# Patient Record
Sex: FEMALE | Race: WHITE | NOT HISPANIC OR LATINO | Employment: UNEMPLOYED | URBAN - METROPOLITAN AREA
[De-identification: names, ages, dates, MRNs, and addresses within clinical notes are randomized per-mention and may not be internally consistent; named-entity substitution may affect disease eponyms.]

---

## 2017-03-09 ENCOUNTER — OFFICE VISIT (OUTPATIENT)
Dept: FAMILY MEDICINE | Facility: CLINIC | Age: 27
End: 2017-03-09

## 2017-03-09 VITALS
WEIGHT: 133 LBS | BODY MASS INDEX: 22.16 KG/M2 | HEART RATE: 91 BPM | DIASTOLIC BLOOD PRESSURE: 82 MMHG | TEMPERATURE: 98.1 F | HEIGHT: 65 IN | OXYGEN SATURATION: 97 % | SYSTOLIC BLOOD PRESSURE: 118 MMHG

## 2017-03-09 DIAGNOSIS — N91.2 AMENORRHEA: Primary | ICD-10-CM

## 2017-03-09 DIAGNOSIS — Z30.8 ENCOUNTER FOR OTHER CONTRACEPTIVE MANAGEMENT: ICD-10-CM

## 2017-03-09 LAB — HCG UR QL: NEGATIVE

## 2017-03-09 RX ORDER — NORETHINDRONE ACETATE AND ETHINYL ESTRADIOL .03; 1.5 MG/1; MG/1
1 TABLET ORAL DAILY
Qty: 63 TABLET | Refills: 4 | Status: SHIPPED | OUTPATIENT
Start: 2017-03-09 | End: 2019-09-17

## 2017-03-09 NOTE — MR AVS SNAPSHOT
"              After Visit Summary   3/9/2017    Vanna Min    MRN: [id number]           Patient Information     Date Of Birth          1990        Visit Information        Provider Department      3/9/2017 11:20 AM Damian Rodriguez MD [facility]        Today's Diagnoses     Amenorrhea    -  1    Encounter for other contraceptive management           Follow-ups after your visit        Who to contact     Please call your clinic at [phone] to:    Ask questions about your health    Make or cancel appointments    Discuss your medicines    Learn about your test results    Speak to your doctor   If you have compliments or concerns about an experience at your clinic, or if you wish to file a complaint, please contact [facility] Patient Relations at [phone] or email us at [email or web address]         Additional Information About Your Visit        MyChart Information     Dubb is an electronic gateway that provides easy, online access to your medical records. With Dubb, you can request a clinic appointment, read your test results, renew a prescription or communicate with your care team.     To sign up for [employer] visit the website at [email or web address]   You will be asked to enter the access code listed below, as well as some personal information. Please follow the directions to create your username and password.     Your access code is: [email or web address]  Expires: 3/12/2017  1:56 PM     Your access code will  in 90 days. If you need help or a new code, please contact your [facility] or call [phone] for assistance.        Care EveryWhere ID     This is your Care EveryWhere ID. This could be used by other organizations to access your [facility] medical records  [id number]        Your Vitals Were     Pulse Temperature Height Pulse Oximetry BMI (Body Mass Index)       91 98.1  F (36.7  C) (Oral) 5' 5\" " (165.1 cm) 97% 22.13 kg/m2        Blood Pressure from Last 3 Encounters:   03/09/17 118/82   09/06/16 111/73   06/29/16 113/77    Weight from Last 3 Encounters:   03/09/17 133 lb (60.3 kg)   09/06/16 139 lb 9.6 oz (63.3 kg)   06/29/16 135 lb 9.6 oz (61.5 kg)              We Performed the Following     HCG Qualitative Urine (UPT)  (P )          Today's Medication Changes          These changes are accurate as of: 3/9/17 11:45 AM.  If you have any questions, ask your nurse or doctor.               These medicines have changed or have updated prescriptions.        Dose/Directions    * norethindrone-ethinyl estradiol 1-20 MG-MCG per tablet   Commonly known as:  MICROGESTIN 1/20   This may have changed:  Another medication with the same name was added. Make sure you understand how and when to take each.   Used for:  Encounter for other contraceptive management        Dose:  1 tablet   Take 1 tablet by mouth daily   Quantity:  63 tablet   Refills:  3       * norethindrone-ethinyl estradiol 1.5-30 MG-MCG per tablet   Commonly known as:  MICROGESTIN 1.5/30   This may have changed:  You were already taking a medication with the same name, and this prescription was added. Make sure you understand how and when to take each.   Used for:  Encounter for other contraceptive management        Dose:  1 tablet   Take 1 tablet by mouth daily Pharmacy may substitute similar standard dose OCP if needed per insurance requirements.   Quantity:  63 tablet   Refills:  4       * Notice:  This list has 2 medication(s) that are the same as other medications prescribed for you. Read the directions carefully, and ask your doctor or other care provider to review them with you.         Where to get your medicines      These medications were sent to [facility] [address] [facility] [address] AT [facility] [address]  [address], [facility] [address]     Phone:  [phone]     norethindrone-ethinyl  estradiol 1.5-30 MG-MCG per tablet                Primary Care Provider Office Phone # Fax #    Damian Rodriguez [phone]       [facility] [address] [place] [address]        Thank you!     Thank you for choosing [facility]  for your care. Our goal is always to provide you with excellent care. Hearing back from our patients is one way we can continue to improve our services. Please take a few minutes to complete the written survey that you may receive in the mail after your visit with us. Thank you!             Your Updated Medication List - Protect others around you: Learn how to safely use, store and throw away your medicines at www.disposemymeds.org.          This list is accurate as of: 3/9/17 11:45 AM.  Always use your most recent med list.                   Brand Name Dispense Instructions for use    * norethindrone-ethinyl estradiol 1-20 MG-MCG per tablet    MICROGESTIN 1/20    63 tablet    Take 1 tablet by mouth daily       * norethindrone-ethinyl estradiol 1.5-30 MG-MCG per tablet    MICROGESTIN 1.5/30    63 tablet    Take 1 tablet by mouth daily Pharmacy may substitute similar standard dose OCP if needed per insurance requirements.       * Notice:  This list has 2 medication(s) that are the same as other medications prescribed for you. Read the directions carefully, and ask your doctor or other care provider to review them with you.

## 2017-03-09 NOTE — PROGRESS NOTES
"[facility]        Subjective   HPI: Vanna Min is a 26 year old  female without significant medical history who presents as an established patient for the followin) Amenorrhea: Was taking low dose microgestin  - had a few periods, but only 1 period since then. Last regular period about 4 months ago.  Before Rita was born (currently 15 months) had normal periods. Misses the reassurance of having her period to know she's not pregnant.     Did have some cramps a few days ago. Two days ago made it to the blank week.     ROS: constitutional, cardiovascular, respiratory, GI, , MSK systems reviewed and negative except as noted above.    PMH:  Patient Active Problem List   Diagnosis     Health Care Home     Screening for cervical cancer      Current Outpatient Prescriptions   Medication         norethindrone-ethinyl estradiol (MICROGESTIN ) 1-20 MG-MCG per tablet     No current facility-administered medications for this visit.       ALLERGIES: Sulfa drugs  Social: Denies tobacco, alcohol and illicit drug use.           Objective   /82  Pulse 91  Temp 98.1  F (36.7  C) (Oral)  Ht 5' 5\" (165.1 cm)  Wt 133 lb (60.3 kg)  SpO2 97%  BMI 22.13 kg/m2 Body mass index is 22.13 kg/(m^2).  Gen: healthy, alert and no distress,  HEENT: no adenopathy, no asymmetry, masses, or scars  Pulm: lungs clear to auscultation - no rales, rhonchi or wheezes  CV: regular rate and rhythm,  and no murmur, click,  rub or gallop  Abd: soft, nontender, without hepatosplenomegaly or masses  Psych: mood and affect normal/bright    Results for orders placed or performed in visit on 17 (from the past 24 hour(s))   HCG Qualitative Urine (UPT)  ([facility])   Result Value Ref Range    HCG Qual Urine NEGATIVE Negative            Assessment & Plan   1. Amenorrhea  - HCG Qualitative Urine (UPT)  ([facility])    2. Encounter for other contraceptive management  Neg UPT as above. Discussed her estrogen dose was likely " too low and this was the cause of the amenorrhea. Reviewed that this is still effective birth control but that if she would like the reassurance of having a period every month we can change to a higher dose of estrogen. Reviewed the slightly high risk of adverse effects, including thromboembolism. Doesn't smoke.    - norethindrone-ethinyl estradiol (MICROGESTIN 1.5/30) 1.5-30 MG-MCG per tablet; Take 1 tablet by mouth daily Pharmacy may substitute similar standard dose OCP if needed per insurance requirements.  Dispense: 63 tablet; Refill: 4    Precepted today with: MD Damian Mccall MD

## 2017-03-16 NOTE — PROGRESS NOTES
Preceptor Attestation:  Patient's case reviewed and discussed with Damian Rodriguez MD.  Patient seen and discussed with the resident.  I agree with assessment and plan of care.  Supervising Physician:  Summer Hi MD  [facility]

## 2017-06-12 DIAGNOSIS — Z30.41 ENCOUNTER FOR SURVEILLANCE OF CONTRACEPTIVE PILLS: Primary | ICD-10-CM

## 2017-06-12 RX ORDER — NORGESTIMATE AND ETHINYL ESTRADIOL 0.25-0.035
1 KIT ORAL DAILY
Qty: 28 TABLET | Refills: 11 | Status: SHIPPED | OUTPATIENT
Start: 2017-06-12 | End: 2019-09-17

## 2018-01-18 ENCOUNTER — OFFICE VISIT (OUTPATIENT)
Dept: FAMILY MEDICINE | Facility: CLINIC | Age: 28
End: 2018-01-18
Payer: COMMERCIAL

## 2018-01-18 VITALS
BODY MASS INDEX: 21.79 KG/M2 | RESPIRATION RATE: 20 BRPM | HEART RATE: 82 BPM | OXYGEN SATURATION: 94 % | DIASTOLIC BLOOD PRESSURE: 63 MMHG | SYSTOLIC BLOOD PRESSURE: 115 MMHG | TEMPERATURE: 98.2 F | HEIGHT: 65 IN | WEIGHT: 130.8 LBS

## 2018-01-18 DIAGNOSIS — L30.9 ECZEMA, UNSPECIFIED TYPE: Primary | ICD-10-CM

## 2018-01-18 RX ORDER — BENZOCAINE/MENTHOL 6 MG-10 MG
LOZENGE MUCOUS MEMBRANE
Qty: 30 G | Refills: 0 | Status: SHIPPED | OUTPATIENT
Start: 2018-01-18 | End: 2019-09-18

## 2018-01-18 NOTE — PROGRESS NOTES
"       HPI:   Vanna Min is a 27 year old  female with rash on right elbow.    Rash:  - rash on right elbow   - started a couple years ago  - 2 weeks ago had a flare where the rash spread  - has tried regular lotion with little relief   - not painful  - occasionally itchy  - not present on other areas of body            Physical Exam:     Vitals:    01/18/18 1423   BP: 115/63   Pulse: 82   Resp: 20   Temp: 98.2  F (36.8  C)   SpO2: 94%   Weight: 130 lb 12.8 oz (59.3 kg)   Height: 5' 4.5\" (163.8 cm)     Body mass index is 22.11 kg/(m^2).    GENERAL APPEARANCE: healthy, alert and no distress  RESP: lungs clear to auscultation - no rales, rhonchi or wheezes  CV: regular rates and rhythm, no murmur, no peripheral edema and peripheral pulses 2+  SKIN: 3 cm dry, scaly plaque on right elbow  PSYCH: mentation appears normal and affect normal/bright    Assessment and Plan   1. Eczema, unspecified type  Appears to be a patch of eczema on right elbow. Discussed applying vaseline BID for increased hydration to area. Also sent hydrocortisone cream to pharmacy if patient desires to try that. Discussed using for short course and avoiding application to face.   - hydrocortisone (CORTAID) 1 % cream; Apply sparingly to affected area three times daily for 7 days.  Dispense: 30 g; Refill: 0    Options for treatment and follow-up care were reviewed with the patient and/or guardian. Vanna Min and/or guardian engaged in the decision making process and verbalized understanding of the options discussed and agreed with the final plan.    Kaykay Marely DO  [facility] Medicine Resident    Precepted with: Buddy Leblanc MD          "

## 2018-01-18 NOTE — PROGRESS NOTES
Preceptor Attestation:  Patient's case reviewed and discussed with Kaykay Marley MD Patient seen and discussed with the resident.. I agree with assessment and plan of care.  Supervising Physician:  Buddy Leblanc MD  [facility]

## 2018-01-18 NOTE — MR AVS SNAPSHOT
"              After Visit Summary   1/18/2018    Vanna Min    MRN: [id number]           Patient Information     Date Of Birth          1990        Visit Information        Provider Department      1/18/2018 2:00 PM Kaykay Marley MD [facility]        Today's Diagnoses     Eczema, unspecified type    -  1       Follow-ups after your visit        Follow-up notes from your care team     Return in about 2 weeks (around 2/1/2018), or if symptoms worsen or fail to improve.      Who to contact     Please call your clinic at [phone] to:    Ask questions about your health    Make or cancel appointments    Discuss your medicines    Learn about your test results    Speak to your doctor   If you have compliments or concerns about an experience at your clinic, or if you wish to file a complaint, please contact [facility] Patient Relations at [phone] or email us at [email or web address]         Additional Information About Your Visit        Care EveryWhere ID     This is your Care EveryWhere ID. This could be used by other organizations to access your [facility] medical records  [id number]        Your Vitals Were     Pulse Temperature Respirations Height Pulse Oximetry BMI (Body Mass Index)    82 98.2  F (36.8  C) 20 5' 4.5\" (163.8 cm) 94% 22.11 kg/m2       Blood Pressure from Last 3 Encounters:   01/18/18 115/63   03/09/17 118/82   09/06/16 111/73    Weight from Last 3 Encounters:   01/18/18 130 lb 12.8 oz (59.3 kg)   03/09/17 133 lb (60.3 kg)   09/06/16 139 lb 9.6 oz (63.3 kg)              Today, you had the following     No orders found for display         Today's Medication Changes          These changes are accurate as of: 1/18/18 11:59 PM.  If you have any questions, ask your nurse or doctor.               Start taking these medicines.        Dose/Directions    hydrocortisone 1 % cream   Commonly known as:  CORTAID   Started by:  Kaykay Marley MD     "    Apply sparingly to affected area three times daily for 7 days.   Quantity:  30 g   Refills:  0            Where to get your medicines      These medications were sent to [id number] - [place], [place] - [address] AT [facility] & [address], [place] [address]     Phone:  [phone]     hydrocortisone 1 % cream                Primary Care Provider Office Phone # Fax #    Damian Rodriguez [phone]       [facility] [address] [facility] [address]        Equal Access to Services     [facility]: Hadii aad ku hadasho Soomaali, waaxda luqadaha, qaybta kaalmada adeegyada, anya stewartin hayaan adejose maria cardozo . So [facility] [phone].    ATENCIÓN: Si habla español, tiene a young disposición servicios gratuitos de asistencia lingüística. Ll[facility] [phone].    We comply with applicable federal civil rights laws and [place] laws. We do not discriminate on the basis of race, color, national origin, age, disability, sex, sexual orientation, or gender identity.            Thank you!     Thank you for choosing [facility]  for your care. Our goal is always to provide you with excellent care. Hearing back from our patients is one way we can continue to improve our services. Please take a few minutes to complete the written survey that you may receive in the mail after your visit with us. Thank you!             Your Updated Medication List - Protect others around you: Learn how to safely use, store and throw away your medicines at www.disposemymeds.org.          This list is accurate as of: 1/18/18 11:59 PM.  Always use your most recent med list.                   Brand Name Dispense Instructions for use Diagnosis    hydrocortisone 1 % cream    CORTAID    30 g    Apply sparingly to affected area three times daily for 7 days.        * norethindrone-ethinyl estradiol 1-20 MG-MCG per tablet    MICROGESTIN 1/20     63 tablet    Take 1 tablet by mouth daily    Encounter for other contraceptive management       * norethindrone-ethinyl estradiol 1.5-30 MG-MCG per tablet    MICROGESTIN 1.5/30    63 tablet    Take 1 tablet by mouth daily Pharmacy may substitute similar standard dose OCP if needed per insurance requirements.    Encounter for other contraceptive management       norgestimate-ethinyl estradiol 0.25-35 MG-MCG per tablet    ORTHO-CYCLEN, SPRINTEC    28 tablet    Take 1 tablet by mouth daily    Encounter for surveillance of contraceptive pills       * Notice:  This list has 2 medication(s) that are the same as other medications prescribed for you. Read the directions carefully, and ask your doctor or other care provider to review them with you.

## 2018-06-24 ENCOUNTER — HEALTH MAINTENANCE LETTER (OUTPATIENT)
Age: 28
End: 2018-06-24

## 2018-09-23 ENCOUNTER — TELEPHONE (OUTPATIENT)
Dept: FAMILY MEDICINE | Facility: CLINIC | Age: 28
End: 2018-09-23

## 2018-09-23 DIAGNOSIS — N30.00 ACUTE CYSTITIS WITHOUT HEMATURIA: Primary | ICD-10-CM

## 2018-09-23 RX ORDER — NITROFURANTOIN 25; 75 MG/1; MG/1
100 CAPSULE ORAL 2 TIMES DAILY
Qty: 10 CAPSULE | Refills: 0 | Status: SHIPPED | OUTPATIENT
Start: 2018-09-23 | End: 2018-09-23

## 2018-09-23 RX ORDER — CEPHALEXIN 500 MG/1
500 CAPSULE ORAL 2 TIMES DAILY
Qty: 10 CAPSULE | Refills: 0 | Status: SHIPPED | OUTPATIENT
Start: 2018-09-23 | End: 2018-09-28

## 2018-09-23 NOTE — TELEPHONE ENCOUNTER
Received call from the patient as the on-call provider. Has been having urinary frequency and burning since her birthday 5 days ago. Has been treated for UTIs in the past, but none recently. No fevers or chills. Has an allergy listed for Sulfa drugs which she states is from childhood, unknown reaction.    I sent a Rx for Macrobid and told her to please call or be seen in clinic for worsening symptoms or drug reaction.    Pedro Sosua MD  [facility], PGY-2

## 2018-09-23 NOTE — TELEPHONE ENCOUNTER
Patient called in again, she had an issue with her insurance and wanted a cheaper medication for her UTI. I sent a Rx for Keflex ~$10 for the script.    Pedro Sousa MD  [facility], PGY-2

## 2019-09-17 ENCOUNTER — OFFICE VISIT (OUTPATIENT)
Dept: FAMILY MEDICINE | Facility: CLINIC | Age: 29
End: 2019-09-17
Payer: COMMERCIAL

## 2019-09-17 VITALS
WEIGHT: 140 LBS | OXYGEN SATURATION: 98 % | TEMPERATURE: 98 F | DIASTOLIC BLOOD PRESSURE: 74 MMHG | BODY MASS INDEX: 23.9 KG/M2 | RESPIRATION RATE: 16 BRPM | HEIGHT: 64 IN | HEART RATE: 89 BPM | SYSTOLIC BLOOD PRESSURE: 113 MMHG

## 2019-09-17 DIAGNOSIS — Z32.01 PREGNANCY TEST POSITIVE: Primary | ICD-10-CM

## 2019-09-17 DIAGNOSIS — N91.2 AMENORRHEA: ICD-10-CM

## 2019-09-17 LAB — HCG UR QL: POSITIVE

## 2019-09-17 RX ORDER — PRENATAL VIT/IRON FUM/FOLIC AC 27MG-0.8MG
1 TABLET ORAL DAILY
Qty: 90 TABLET | Refills: 3 | Status: SHIPPED | OUTPATIENT
Start: 2019-09-17 | End: 2019-12-19

## 2019-09-17 ASSESSMENT — MIFFLIN-ST. JEOR: SCORE: 1350.91

## 2019-09-17 NOTE — PROGRESS NOTES
Assessment and Plan     1. Pregnancy test positive  Advised against starting any meds, EtOH, nicotine, or drugs. Patient to get set up for labs, US, and pregnancy care here.    2. Amenorrhea  - HCG Qualitative Urine (UPT)  ([facility])  - Prenatal Vit-Fe Fumarate-FA (PRENATAL MULTIVITAMIN W/IRON) 27-0.8 MG tablet; Take 1 tablet by mouth daily  Dispense: 90 tablet; Refill: 3      Options for treatment and follow-up care were reviewed with the patient and/or guardian. Vanna Min and/or guardian engaged in the decision making process and verbalized understanding of the options discussed and agreed with the final plan. I answered all of their questions.    Stalin Crain III, MD, FAAFP  [facility]  09/18/19 9:01 AM           HPI:       Vanna Min is a 29 year old  female  Patient presents with:  Pregnancy Test: positive home pregnancy test  Medication Reconciliation: needs attention    LMP: 4-6 weeks ago, patient isn't totally sure.    Second pregnancy. No issues with the last one. Denies GDM or pre-term labor. Not currently taking any medicines. Desired pregnancy and is excited. Denies EtOH, nicotine, or drug use. Interested in following for her pregnancy here. Feeling well today. No problems with PO intake or nausea.         PMHX:     Patient Active Problem List   Diagnosis     Screening for cervical cancer       Current Outpatient Medications   Medication Sig Dispense Refill     Prenatal Vit-Fe Fumarate-FA (PRENATAL MULTIVITAMIN W/IRON) 27-0.8 MG tablet Take 1 tablet by mouth daily 90 tablet 3       Social History     Socioeconomic History     Marital status: Single     Spouse name: Not on file     Number of children: Not on file     Years of education: Not on file     Highest education level: Not on file   Occupational History     Not on file   Social Needs     Financial resource strain: Not on file     Food insecurity:     Worry: Not on file     Inability: Not on file      "Transportation needs:     Medical: Not on file     Non-medical: Not on file   Tobacco Use     Smoking status: Former Smoker     Smokeless tobacco: Former User     Quit date: 12/2/2013   Substance and Sexual Activity     Alcohol use: No     Alcohol/week: 0.0 oz     Drug use: No     Sexual activity: Yes     Partners: Male     Birth control/protection: None   Lifestyle     Physical activity:     Days per week: Not on file     Minutes per session: Not on file     Stress: Not on file   Relationships     Social connections:     Talks on phone: Not on file     Gets together: Not on file     Attends Tenriism service: Not on file     Active member of club or organization: Not on file     Attends meetings of clubs or organizations: Not on file     Relationship status: Not on file     Intimate partner violence:     Fear of current or ex partner: Not on file     Emotionally abused: Not on file     Physically abused: Not on file     Forced sexual activity: Not on file   Other Topics Concern     Parent/sibling w/ CABG, MI or angioplasty before 65F 55M? Not Asked   Social History Narrative     Not on file       Allergies   Allergen Reactions     Sulfa Drugs        Results for orders placed or performed in visit on 09/17/19 (from the past 24 hour(s))   HCG Qualitative Urine (UPT)  ([facility])   Result Value Ref Range    HCG Qual Urine POSITIVE (A) Negative            Review of Systems:     Review of Systems   All other systems reviewed and are negative.           Physical Exam:     Vitals:    09/17/19 1539   BP: 113/74   Pulse: 89   Resp: 16   Temp: 98  F (36.7  C)   TempSrc: Oral   SpO2: 98%   Weight: 63.5 kg (140 lb)   Height: 1.635 m (5' 4.37\")     Body mass index is 23.76 kg/m .    Physical Exam   Constitutional: She is oriented to person, place, and time. She appears well-developed and well-nourished.  Non-toxic appearance. She does not have a sickly appearance. No distress.   Pulmonary/Chest: No respiratory distress. "   Neurological: She is alert and oriented to person, place, and time.   Skin: She is not diaphoretic.   Nursing note and vitals reviewed.

## 2019-09-18 ENCOUNTER — TELEPHONE (OUTPATIENT)
Dept: FAMILY MEDICINE | Facility: CLINIC | Age: 29
End: 2019-09-18

## 2019-09-18 NOTE — TELEPHONE ENCOUNTER
Called patient for OB intake, no answer. Left VM on identified VM to call clinic for intake. Rudolph GENTILE

## 2019-09-18 NOTE — TELEPHONE ENCOUNTER
Patient returned call to KRUPA Alcantar at 12:01pm. Notified patient their will be a meeting from 12pm-1pm and I would take a message for the nurse to return the call. Patient understood. Please call and advise.

## 2019-09-27 NOTE — TELEPHONE ENCOUNTER
Vanna is a 28yo female  who came into clinic with positive pregnancy test. Vanna has a medical history of anxiety, not currently taking medication. Her mother has a history of breast cancer, and FOB's mother has a history of tuberculosis approximately 24 years ago. Vanna has a significant drug allergy history to sulfa drugs. She also noted Prozac has caused her to have breathing difficulties. Vanna's previous delivery was complicated with GBS+,  resuscitation requiring intubation and suctioning for 1 minute. The  was born at 6lb7oz via . No other medical history noted. Vanna endorses her LMP is unknown. Vanna wishes to see a female provider, she is schedule for NOB on 10/16/2019 with . Rudolph GENTILE    Average Risk Category  No significant risk factors: No    At Risk Category (up to 3)  Teen pregnancy: No  Poor social situation: No  Domestic abuse: No  Financial difficulties: No  Smoker: No  H/O  deliver: No  H/O drug abuse: No  Non-English speaking: No  Advanced maternal age: No  GDM risks: No  Previous C/S: No  H/O PIH: No  H/O STIs: No  H/O mental health concerns: Yes, h/o anxiety  Onset care > 20 weeks: No  Other:     High Risk Category (4 or more At Risk or)  Diabetes/GDM: No  Multiple gestation: No  Chronic hypertension: No  Significant hx of asthma: No  Fetal demise > 20 weeks: No  Positive tox screen: No  Current mental health treatment: No  Other:     Risk: Average Risk   Date determined: 2019  Rudolph GENTILE

## 2019-10-16 ENCOUNTER — OFFICE VISIT (OUTPATIENT)
Dept: FAMILY MEDICINE | Facility: CLINIC | Age: 29
End: 2019-10-16
Payer: COMMERCIAL

## 2019-10-16 VITALS
OXYGEN SATURATION: 97 % | WEIGHT: 145 LBS | RESPIRATION RATE: 18 BRPM | TEMPERATURE: 98.1 F | HEART RATE: 88 BPM | BODY MASS INDEX: 24.16 KG/M2 | HEIGHT: 65 IN | DIASTOLIC BLOOD PRESSURE: 77 MMHG | SYSTOLIC BLOOD PRESSURE: 116 MMHG

## 2019-10-16 DIAGNOSIS — Z34.81 ENCOUNTER FOR SUPERVISION OF OTHER NORMAL PREGNANCY, FIRST TRIMESTER: Primary | ICD-10-CM

## 2019-10-16 LAB
BILIRUBIN UR: NEGATIVE MG/DL
BLOOD UR: NEGATIVE MG/DL
GLUCOSE URINE: NEGATIVE
HCT VFR BLD AUTO: 40.1 % (ref 35–47)
HEMOGLOBIN: 12.3 G/DL (ref 11.7–15.7)
HIV 1+2 AB+HIV1 P24 AG SERPL QL IA: NEGATIVE
KETONES UR QL: NEGATIVE MG/DL
LEUKOCYTE ESTERASE UR: NEGATIVE
MCH RBC QN AUTO: 29.8 PG (ref 26.5–35)
MCHC RBC AUTO-ENTMCNC: 30.7 G/DL (ref 32–36)
MCV RBC AUTO: 97.2 FL (ref 78–100)
NITRITE UR QL STRIP: NEGATIVE MG/DL
PH UR STRIP: 6 [PH] (ref 4.5–8)
PLATELET # BLD AUTO: 382 K/UL (ref 150–450)
PROTEIN UR: NEGATIVE MG/DL
RBC # BLD AUTO: 4.13 M/UL (ref 3.8–5.2)
SP GR UR STRIP: 1.02 (ref 1–1.03)
UROBILINOGEN UR STRIP-ACNC: NORMAL E.U./DL
WBC # BLD AUTO: 12.8 K/UL (ref 4–11)

## 2019-10-16 ASSESSMENT — MIFFLIN-ST. JEOR: SCORE: 1377.98

## 2019-10-16 NOTE — LETTER
October 16, 2019      Vanna Min  [address]  [place] [address]        To whom is may concern,    Roz Min has a confirmed pregnancy on 9/17/2019. If you have any questions please contact [facility] at [phone].        Sincerely,          Arline Casey MD

## 2019-10-16 NOTE — LETTER
October 18, 2019      Vanna Min  [address]  [place] [address]        Dear Vanna,    Please see below for your test results.      Thank you for visiting our clinic on Oct 16, 2019.     We performed several lab tests at your first OB visit and the results are shown below. Your blood type is AB positive.  Your tests for Hepatitis B, HIV, syphilis, gonorrhea, chlamydia, and HIV were negative. Your urine test was normal. You do not have anemia. Your white blood cell count was a little high, however your other labs were negative for signs of infection and your exam was normal, so we do not need to do anything about this slightly high white blood cell count at this time. You are immune to the rubella virus.     If you have any questions or concerns, please feel free to give us a call.       Resulted Orders   ABO/Rh Type-HML ([facility])   Result Value Ref Range    ABO/Rh(D) AB POS     Repeat ABO/Rh Typing (L) AB POS     Narrative    Test performed by:   [facility] [address], [place] [address]   Antibody Screen ([facility])   Result Value Ref Range    Antibody Screen Negative Negative    Narrative    Test performed by:   [facility] [address], [place] [address]   Chlamydia/Gono Amplified ([facility])   Result Value Ref Range    Chlamydia trac,Amplified Prb Negative Negative    N gonorrhoeae,Amplified Prb Negative Negative    Narrative    Test performed by:  [facility]  [address] [place] [address]   CBC with Plt ([facility])   Result Value Ref Range    WBC 12.8 (H) 4.0 - 11.0 K/uL    RBC 4.13 3.80 - 5.20 M/uL    Hemoglobin 12.3 11.7 - 15.7 g/dL    Hematocrit 40.1 35.0 - 47.0 %    MCV 97.2 78.0 - 100.0 fL    MCH 29.8 26.5 - 35.0 pg    MCHC 30.7 (L) 32.0 - 36.0 g/dL    Platelets 382.0 150.0 - 450.0 K/uL   Culture Urine ([facility])   Result Value Ref Range    Culture SEE RESULTS BELOW       Comment:      CULTURE, URINE   SOURCE: Urine, Random   CULTURE RESULTS:     No Growth      Narrative    Test performed by:  [facility] [address] [place] [address]   Hepatitis B Surface Ag ([facility])   Result Value Ref Range    Hepatitis B Surface Antigen Negative Negative    Narrative    Test performed by:  [facility] [address] [place] [address]   HIV Ag/Ab Screen Bee ([facility])   Result Value Ref Range    HIV Antigen/Antibody Negative Negative    Narrative    Test performed by:  [facility] [address] [place] [address]  Method is Abbott HIV Ag/Ab for the detection of HIV p24 antigen, HIV-1   antibodies and HIV-2 antibodies.   Rubella  IgG ([facility])   Result Value Ref Range    Rubella IgG Positive     Narrative    Test performed by:  [facility] [address] [place] [address]  Negative: Absence of detectable rubella virus IgG antibodies. A negative   result presumes that immunity has not been acquired.   Equivocal: Suggest recollection.  Positive: Considered positive for IgG antibodies to rubella virus.   Syphilis Screen Bee ([facility])   Result Value Ref Range    Treponema Antibody (Syphilis) Negative Negative    Narrative    Test performed by:  [facility] [address] [place] [address]   Urinalysis([facility])   Result Value Ref Range    Specific Gravity Urine 1.025 1.005 - 1.030    pH Urine 6.0 4.5 - 8.0    Leukocyte Esterase UR Negative NEGATIVE    Nitrite Urine Negative NEGATIVE mg/dL    Protein UR Negative NEGATIVE mg/dL    Glucose Urine Negative NEGATIVE    Ketones Urine Negative NEGATIVE mg/dL    Urobilinogen mg/dL 0.2 E.U./dL 0.2 E.U./dL E.U./dL    Bilirubin UR Negative NEGATIVE mg/dL    Blood UR Negative NEGATIVE mg/dL       If you have any questions, please call the clinic to make an appointment.    Sincerely,    Arline Casey MD

## 2019-10-16 NOTE — PATIENT INSTRUCTIONS
[facility] Information  If you have any further concerns or wish to schedule another appointment, please call our office at [phone] during normal business hours (8-5, M-F).     For uncomplicated pregnancies, you will be seeing your doctor once a month until you are 28 weeks, then you will see your doctor twice a month. You will begin weekly visits at 36 weeks until you deliver.     If you have urgent medical questions that cannot wait, you may call [phone] at any time of day. Call your doctor if you experience any bleeding or abdominal cramping early in pregnancy.     If you have a medical emergency, please call [phone].  Tips to Relieve Nausea  Although nausea can occur at any time of the day, it may be worse in the morning. To help prevent nausea:    Eat small, light meals at frequent intervals.    Get up slowly. Eat a few unsalted crackers before you get out of bed.    Drink water with lemon slices or 7-up to soothe your stomach.    Eat an ice pop in your favorite flavor.    Ask your health care provider about taking jaky or vitamin B6 for nausea and vomiting.    Talk with your health care provider if you take vitamins that upset your stomach.  Safe Medications    Take one prenatal vitamin with at least 400 mcg of folic acid daily.    Use acetaminophen (Tylenol) for pain.     Try Maalox or Tums for heartburn. If these are not working, talk to your doctor about trying a different medication.    Diphenhydramine (Benadryl) can also be used safely in pregnancy.    Talk to your doctor about any other medications or vitamins you are taking!  Start Healthy Habits Now  What matters most is protecting your baby from this moment on. If you smoke, drink alcohol, or use drugs, now is the time to stop. If you need help, talk with your health care provider.    Smoking increases the risk of miscarriage or having a low-birth-weight baby. If you smoke, quit now.    Alcohol and drugs have been linked with  miscarriage, birth defects, intellectual disability, and low birth weight. Do not drink alcohol or take drugs.    Continue your current exercise routine, or start one with walking, swimming, and other low impact exercises. Yoga specifically designed for pregnant moms is a great stress and pain reliever. Keep your self well hydrated and avoid overheating with all activity. If bleeding, fluid leakage, or cramping/contractions occur, stop the exercise and call your doctor.     Wear your seatbelt every time you are in the car. Fasten the lap part underneath your growing belly and the shoulder part as you normally would.   Weight Gain    Add an additional 300 to 400 calories a day into your diet.    Ideal weight gain is 25 to 35 pounds.    If your BMI is over 30, your ideal weight gain is 15 pounds.    If your BMI is under 20, your ideal weight gain is 40 pounds.    Talk to your doctor if you are concerned about weight gain.   Keep Your Environment Save  You can still clean house and use scented products. Just take some simple precautions:    Wear gloves when using cleaning fluids.    Open windows to let in fresh air. Use a fan if you paint.    Avoid secondhand smoke.    Don t breathe fumes from nail polish, hair spray, cleansers, or other chemicals.  Work Concerns  The end of the first trimester is a good time to discuss working during pregnancy with your employer. Follow your health care provider s advice if your job requires you to stand for a long time, work with hazardous tools, or even sit at a desk all day. Your workspace, workload, or scheduled hours may need to be adjusted - perhaps you can change body postures more often or take an extra break.  Intimacy  Unless your health care provider tells you to, there is no reason to stop having sex while you re pregnant. You or your partner may notice changes in desire. Desire may be less in the first trimester, due to nausea and fatigue. In the second trimester, sex may  be very enjoyable. The third trimester can be a challenge comfort-wise. Try different positions and see what s best for you both. As always, let your doctor know if you experience any bleeding, leakage of fluids, or cramping/contractions.  Other Pregnancy Concerns    Limit the amount of radiation you are exposed to. Always tell the radiology technologist that you are pregnant if having an xray or CT scan done.     Practice good hand washing to prevent infection.    Avoid cat litter.     Wash all fruits and vegetabes. Always cook meat thoroughly and do not eat raw fish. Do not eat more than 6 to 12 ounces of other fish sources.     Limit caffeine to less than 300 milligrams a days. The average cup of coffee as approximately 120 milligrams of caffeine.      Breastfeeding: a Healthy Option for You and Your Baby    The choice of how you will feed your baby is important. Before your baby s birth, you ll want to learn about the benefits of breastfeeding. [facility] have been designated Baby Friendly; an initiative that was created by the World Health Organization and UNICEF. This helps give you and your baby the best start in feeding their baby.    Why should I breastfeed my baby?    Babies are less likely to develop childhood obesity or diabetes    Babies are less likely to suffer from recurrent ear infections    Babies are less likely to be hospitalized for respiratory conditions    Breast milk is rich in nutrients and antibodies-it is easy to digest    How does it benefit me?    Lowers the risk for diabetes, breast and ovarian cancer and postpartum depression    Moms can lose  baby weight  more quickly    Cost savings - formula can cost well over $1,500 per year    Convenient - no bottles and nipples to sterilize, no measuring and mixing formula    The physical contact with breastfeeding can make babies feel secure, warm and comforted     What about formula?  While you and your baby are staying with us at  [facility], we will support whatever feeding choice you make for your baby.      Some important considerations:      The American Academy of Pediatrics, the World Health Organization, and many more organizations recommend exclusive breastfeeding for 6 months and continued breastfeeding while adding other foods for the first 1-2 years.      Any amount of breastmilk has benefits to both baby and mother.    Giving formula in replacement of breastfeeding can affect mother s milk supply.  If formula is needed, hospital staff will work with you on a plan to help develop your milk supply.    Formula alters the natural growth of good bacteria in the  stomach.     Research has found that first time mothers who offer formula in the hospital have a shorter duration of breastfeeding.    How can I start to prepare?     Start by having a conversation with your medical provider.     Talk with your partner, family and friends.     Attend a prenatal class that includes breastfeeding preparation. Birth and breastfeeding classes are offered by [facility]. Visit [employer] for class information.     After your baby s birth, hospital staff and lactation consultants will help you and your baby get off to a great start with breastfeeding.    Resources:      [facility] clinic and hospital staff will support you throughout your pregnancy, delivery and beyond. Visit [email or web address] for more details.       A free weekly pregnancy and parenting email is offered by [facility]. Emails include week by week information about your pregnancy, baby s development, breastfeeding and beyond.  Sign up at [email or web address].      [facility] [phone].  Consultants are available for assessment of your breastfeeding concerns, support and education.      [employer] helps mothers worldwide to breastfeed through mother-to-mother support, encouragement, information and  education. [facility] promotes breastfeeding as an important element in the healthy development of baby and mother. Monthly classes, support groups and telephone help are offered in your community. To learn more visit [email or web address].      [facility] Care Connection: [phone] ([phone])

## 2019-10-16 NOTE — NURSING NOTE
"Chief Complaint   Patient presents with     Prenatal Care     New 1st OB check up.      Recheck Medication     complicated.        /77   Pulse 88   Temp 98.1  F (36.7  C) (Oral)   Resp 18   Ht 1.65 m (5' 4.96\")   Wt 65.8 kg (145 lb)   LMP 08/10/2019   SpO2 97%   BMI 24.16 kg/m      "

## 2019-10-16 NOTE — PROGRESS NOTES
Pt asleep breathing even and unlabored nos igns of any distress noted. Sitter in direct obs of pt. Will continue to   monitor.    Vanna Min is a 29 year old  female who presents to clinic for a new OB visit.  Her last menstrual period was unknown (first or second week of August) and she had a history of previous irregular menstrual cycles. Estimated Date of Delivery: approximately May 16, 2020, Making her approximately 9-10 weeks gestation today.    She has not had bleeding since her LMP. She has not had any abdominal pain or cramping since her LMP. She has had mild nausea. Weigh loss has not occurred. This was not a planned pregnancy, but is desired.     Pre Term Labor Risk Assessment  Is the patient's age <18 or >40? No  Patint's BMI is Body mass index is 24.16 kg/m .. Does patient have a BMI < 18.5? No  If previous pregnancy, was delivery within previous 6 months? No  Have you ever delivered a baby prior to 37 weeks gestation? No  Did conception for this pregnancy occur via In Vitro Fertilization? No  Are you carrying twins?  No  Summary: Patient is not high risk for  Labor for  labor.    Summary:  Patient is average risk for  Labor   The patient has the following risk factors for  labor: Q19: History of anxiety  Average risk pregnancy    Patient Active Problem List   Diagnosis     Screening for cervical cancer     Encounter for supervision of other normal pregnancy, first trimester     OB History    Para Term  AB Living   2 1 1 0 0 1   SAB TAB Ectopic Multiple Live Births   0 0 0 0 1      # Outcome Date GA Lbr Steven/2nd Weight Sex Delivery Anes PTL Lv   2 Current            1 Term 12/01/15 40w0d  2.92 kg (6 lb 7 oz) F Vag-Spont None N EVAN      Birth Comments: GBS +,  resuscitation: intubation, suctioning,PPV nasopharynx,mouth,nose. Suction below vocal cords- duration in 1 min      Name: Rita Min-Joe      Apgar1: 5  Apgar5: 9     Past Medical History:   Diagnosis Date     NO ACTIVE PROBLEMS      Varicella     hx of disease      Past Surgical History:   Procedure  Laterality Date     ENT SURGERY      tubes put in ears when 1 or 2     Current Outpatient Medications   Medication Sig Dispense Refill     Prenatal Vit-Fe Fumarate-FA (PRENATAL MULTIVITAMIN W/IRON) 27-0.8 MG tablet Take 1 tablet by mouth daily 90 tablet 3       Do you have a history of any of the following medical conditions?  Condition Yes/No   Hypertension No   Heart disease, mitral valve prolapse, rheumatic fever No   Asthma or another chronic lung disease No   An autoimmune disorder No   Kidney disease No   Frequent urinary tract infections No   Migraine headaches No   Stroke, loss of sensation/function, seizures, or other neuro problem No   Diabetes No   Thyroid problems or have you taken thyroid medication No   Hepatitis, liver disease, jaundice No   Blood clots, phlebitis, pulmonary embolism or varicose veins No   Excessive bleeding after surgery or dental work No   Heavy menstrual periods requiring treatment No   Anemia No   Blood transfusions No        Would you refuse a blood transfusion? No   Breast problems No   Abnormalities of the uterus No   Abnormal pap smear No   Have you been treated for an emotional disturbance? YES - prozac 10+ years ago   Have you been physically, sexually, or emotionally hurt by someone? No   Have you been in a major accident or suffered serious trauma? No   Have you had surgical procedures? Yes - PE tubes as a child   Have you ever had any complications from anesthesia? No   Have you ever been hospitalized for a nonsurgical reason? No     Prenatal Genetic Screening  Do you have a history of any of the following Yes/No   A metabolic disorder (e.g. Insulin-dependent DM, PKU) No   Recurrent pregnancy loss No     Do you, the baby's father, or anyone in your families have Yes/No   Thalassemia AND MCV <80 No   Hemophilia No   Neural tube defect No   Congenital heart defect No   Sickle cell disease or trait No   Muscular dystrophy No   Cystic fibrosis No   Mental retardation or  "autism No   Down's syndrome No   Jose Francisco-Sach's disease No   Cloud's chorea No   Any other inherited genetic or chromosomal disorder No   A child with birth defects not listed above No     Infection History  At high risk for coming in contact with HIV No   Ever treated for tuberculosis No   Ever received the BCG vaccine for tuberculosis No   Ever had genital herpes (or has your partner) No   Had a rash or viral illness since LMP No   Ever had a sexually transmitted infection No   Ever had chicken pox or the vaccine Yes   Ever had any other serious infectious disease No   Up to date with immunizations Yes   STI History YES - Trich 2011      PERSONAL/SOCIAL HISTORY  Have you used any tobacco products, alcohol or any other drugs during this pregnancy before or after your knew you were pregnant? No    REVIEW OF SYSTEMS  C: NEGATIVE for fever, chills, change in weight  E: NEGATIVE for vision changes or irritation  ENT: NEGATIVE for ear, mouth and throat problems  R: NEGATIVE for significant cough or SOB  B: NEGATIVE for masses, tenderness or discharge  CV: NEGATIVE for chest pain, palpitations or peripheral edema  GI: NEGATIVE for nausea, abdominal pain, heartburn, or change in bowel habits  : NEGATIVE for unusual urinary or vaginal symptoms.   M: NEGATIVE for significant arthralgias or myalgia  N: NEGATIVE for weakness, dizziness or paresthesias  P: NEGATIVE for changes in mood or affect    PHYSICAL EXAM:  /77   Pulse 88   Temp 98.1  F (36.7  C) (Oral)   Resp 18   Ht 1.65 m (5' 4.96\")   Wt 65.8 kg (145 lb)   LMP 08/10/2019   SpO2 97%   BMI 24.16 kg/m     Pregravid weight: 138 lb  GENERAL:  Pleasant pregnant female, alert, well groomed.  SKIN:  Warm and dry, without lesions or rashes  EYES:  PERRLA, EOM intact  MOUTH:  Buccal mucosa pink, moist without lesions.    NECK:  Thyroid without enlargement and nodules.  No cervical lymphadenopathy.  LUNGS:  Clear to auscultation.  HEART:  RRR without " murmur.  ABDOMEN: Soft without masses , tenderness or organomegaly.  No CVA tenderness. No scars noted. Fundus palpable at symphysis pubis.   MUSCULOSKELETAL:  Full range of motion.  EXTREMITIES:  No edema. No significant varicosities.     ASSESSMENT/PLAN  Patient Active Problem List   Diagnosis     Screening for cervical cancer     Encounter for supervision of other normal pregnancy, first trimester     Routine prenatal labs today. CBC, type and screen, rubella, HIV, gonorrhea/chlamydia, RPR, hepatitis B, urinalysis with culture. Pap smear up to date, next pap due 2020. Early ultrasound for dating ordered.     Referral(s): none    Discussed:  -recommended weight gain of > 35 lbs. for BMI of Body mass index is 24.16 kg/m .  -Influenza vaccine discussed and declined - will offer again at next visit.  -healthy habits including not using tobacco or alcohol, exercising regularly and maintaining healthy diet  -information given on tips for dealing with nausea, healthy habits, exposures, safety, prenatal appointment schedule, and when to call the doctor.    Will follow up in 4 weeks for routine pre- care.    Arline Casey MD  [facility] Family Medicine Resident    Precepted with: Lesli Varner MD

## 2019-10-17 LAB
ABO + RH BLD: NORMAL
BLD GP AB SCN SERPL QL: NEGATIVE
C TRACH RRNA SPEC QL NAA+PROBE: NEGATIVE
CULTURE: NORMAL
HBV SURFACE AG SERPL QL IA: NEGATIVE
N GONORRHOEA RRNA SPEC QL NAA+PROBE: NEGATIVE
REPEAT ABO/RH TYPING (HML): NORMAL
RUBV IGG SERPL QL IA: POSITIVE
TREPONEMA ANTIBODY (SYPHILIS): NEGATIVE

## 2019-10-22 NOTE — PROGRESS NOTES
Preceptor Attestation:  Patient's case reviewed and discussed with Arline Casey MD resident and I evaluated the patient. I agree with written assessment and plan of care.  Supervising Physician:  BANDAR ARMSTRONG MD  [facility]

## 2019-10-24 DIAGNOSIS — Z34.81 ENCOUNTER FOR SUPERVISION OF OTHER NORMAL PREGNANCY, FIRST TRIMESTER: ICD-10-CM

## 2019-11-19 ENCOUNTER — OFFICE VISIT (OUTPATIENT)
Dept: FAMILY MEDICINE | Facility: CLINIC | Age: 29
End: 2019-11-19
Payer: COMMERCIAL

## 2019-11-19 VITALS
SYSTOLIC BLOOD PRESSURE: 129 MMHG | RESPIRATION RATE: 24 BRPM | BODY MASS INDEX: 25.29 KG/M2 | HEIGHT: 65 IN | HEART RATE: 100 BPM | TEMPERATURE: 98 F | WEIGHT: 151.8 LBS | OXYGEN SATURATION: 94 % | DIASTOLIC BLOOD PRESSURE: 78 MMHG

## 2019-11-19 DIAGNOSIS — Z34.81 ENCOUNTER FOR SUPERVISION OF OTHER NORMAL PREGNANCY, FIRST TRIMESTER: Primary | ICD-10-CM

## 2019-11-19 ASSESSMENT — MIFFLIN-ST. JEOR: SCORE: 1409.44

## 2019-11-19 NOTE — PROGRESS NOTES
"Vanna Min is a 29 year old  female who presents to clinic for a follow up OB visit. She is currently 13w5d with an estimated date of delivery of May 20, 2020 via 10w US. She denies chest pain, abdominal pain/contractions, vaginal bleeding, or abnormal discharge.     New concerns today:     Taking prenatal vitamin daily to every other day.     Two episodes of nausea/vomiting. Improved with rest/fluids.     Headaches: similar to pre pregnancy, worse with stress    Is having some shortness of breath when supine, and sometimes with exertion. Feels this is similar to previous pregnancy   Physical exam:  /78   Pulse 100   Temp 98  F (36.7  C) (Oral)   Resp 24   Ht 1.651 m (5' 5\")   Wt 68.9 kg (151 lb 12.8 oz)   LMP 08/10/2019 (LMP Unknown)   SpO2 94%   BMI 25.26 kg/m    Wt Readings from Last 2 Encounters:   19 68.9 kg (151 lb 12.8 oz)   10/16/19 65.8 kg (145 lb)     General: alert, female in no acute distress  CV: 2/6 systolic flow murmur heard best at left upper sternal border   Abdomen: gravid uterus appropriate for gestation age 15cm above pubic symphysis, non-tender, FHTs 130's  Extremities: no edema    Assessment/Plan:  1. Encounter for supervision of other normal pregnancy, first trimester  Reviewed pre- labs.  Discussed warning signs to watch for and expectations for second trimester.    Follow up in 4 weeks for routine prenatal visit. Will do pap and flu shot at next visit    Devin Madison, MS3     I was present with the medical student who participated in the service and in the documentation of this note. I have verified the history and personally performed the physical exam and medical decision making, and have verified the content of the note, which accurately reflects my assessment of the patient and the plan of care.    Arline Casey MD  [facility] Family Medicine Resident    Precepted with: Enrrique Power MD    "

## 2019-11-19 NOTE — PROGRESS NOTES
Preceptor Attestation:   Patient seen, evaluated and discussed with the resident. I have verified the content of the note, which accurately reflects my assessment of the patient and the plan of care.  Supervising Physician:Enrrique Power MD  [facility]

## 2019-12-18 NOTE — PROGRESS NOTES
"Vanna Min is a 29 year old  female who presents to clinic for a follow up OB visit. She is currently 18w0d with an estimated date of delivery of May 20, 2020 via 10w US. She denies headache, chest pain, shortness of breath, abdominal pain/contractions, vaginal bleeding, or abnormal discharge. She has felt baby move.     New concerns today: No new concerns. Has been taking prenatal vitamin  every other day. Worried about excess weight gain (gained nearly 70 lbs with prior pregnancy), weight gain so far around 18 lbs.     Physical exam:  /73   Pulse 100   Temp 97.9  F (36.6  C) (Oral)   Resp 18   Ht 1.635 m (5' 4.37\")   Wt 71.1 kg (156 lb 12.8 oz)   LMP 08/10/2019 (LMP Unknown)   SpO2 97%   BMI 26.61 kg/m      General: alert, female in no acute distress  Abdomen: gravid uterus appropriate for gestation age above pubic symphysis, non-tender, FHTs 150  : normal external genitalia, normal vagina, normal cervix with physiologic discharge  Extremities: no edema     Assessment/Plan:  1. Encounter for supervision of other normal pregnancy, second trimester  - Declined flu shot today  - Discussed genetic screening options; patient declined  - Discussed expectations of the second trimester and when to come in  -  OB > 14 Weeks; Future - fetal anatomy ultrasound scheduled for 22 weeks  - Continued encouragement of breastfeeding.  - Refilled prenatal  - Follow up in 4 weeks    2. Screening for cervical cancer  - [facility]YN Cytology ([facility])    Arline Casey MD  [facility] Family Medicine Resident    Precepted with: Buddy Leblanc MD      "

## 2019-12-19 ENCOUNTER — OFFICE VISIT (OUTPATIENT)
Dept: FAMILY MEDICINE | Facility: CLINIC | Age: 29
End: 2019-12-19
Payer: COMMERCIAL

## 2019-12-19 VITALS
DIASTOLIC BLOOD PRESSURE: 73 MMHG | HEART RATE: 100 BPM | HEIGHT: 64 IN | WEIGHT: 156.8 LBS | RESPIRATION RATE: 18 BRPM | OXYGEN SATURATION: 97 % | BODY MASS INDEX: 26.77 KG/M2 | TEMPERATURE: 97.9 F | SYSTOLIC BLOOD PRESSURE: 107 MMHG

## 2019-12-19 DIAGNOSIS — N91.2 AMENORRHEA: ICD-10-CM

## 2019-12-19 DIAGNOSIS — Z34.82 ENCOUNTER FOR SUPERVISION OF OTHER NORMAL PREGNANCY, SECOND TRIMESTER: Primary | ICD-10-CM

## 2019-12-19 DIAGNOSIS — Z12.4 SCREENING FOR CERVICAL CANCER: ICD-10-CM

## 2019-12-19 RX ORDER — PRENATAL VIT/IRON FUM/FOLIC AC 27MG-0.8MG
1 TABLET ORAL DAILY
Qty: 90 TABLET | Refills: 3 | Status: SHIPPED | OUTPATIENT
Start: 2019-12-19 | End: 2020-07-22

## 2019-12-19 ASSESSMENT — MIFFLIN-ST. JEOR: SCORE: 1422.11

## 2019-12-19 NOTE — LETTER
January 3, 2020      Vanna Min  [address]  [place] [address]        Dear Vanna,    Please see below for your test results.      Thank you for visiting our clinic on Dec 19, 2019.     The results of your recent pap smear has returned.        1. Result is:   Normal   2. Date next is due: 5 years (Decemer 2024) With HPV     If you have any questions or concerns, please feel free to give us a call.   Resulted Orders   GYN Cytology ([facility])   Result Value Ref Range    Lab AP Case Report SEE RESULTS BELOW       Comment:      Gynecologic Cytology Report                       Case: [id number]                                     Authorizing Provider:  Buddy Leblanc MD     Collected:             12/19/2019 1504              Ordering Location:      [facility] Lab Received:              12/20/2019 0854              First Screen:          Maia Lockett CT                                                                             (ASCP)                                                                         Specimen:    SUREPATH PAP, SCREENING, Endocervical/cervical                                               Lab AP Gyn Interpretation SEE RESULTS BELOW Negative for squamous intraepithelial le      Comment:      Negative for squamous intraepithelial lesion or malignancy  Electronically signed by Maia Lockett CT (ASCP) on 12/31/2019 at    8:54 AM      Lab AP Malignant? Normal Normal    Specimen adequacy:       Satisfactory for evaluation, endocervical/transformation zone component present    HPV Reflex? Yes regardless of result     High Risk? No     Last Mens Period 8/10/19     Lab AP Abnormal Bleeding No     Lab AP Patient Status Pregnant     Lab AP Birth Control/Hormones None     Lab AP Previous Normal 4/2015     Lab AP Previous Abnl none     Lab AP Cervical Appearance normal     Narrative    Test performed by:  [facility] [address] [place] [address]   [facility]   Result Value Ref Range    HPV Source SurePath     HPV 16 DNA Negative NEG    HPV 18 DNA Negative NEG    Other HR HPV Negative NEG    Final Diagnosis SEE NOTES       Comment:      This patient's sample is negative for HPV DNA.  This test was developed and its performance characteristics determined by the  [facility]. It  has not been cleared or approved by the FDA. The laboratory is regulated under  CLIA as qualified to perform high-complexity testing. This test is used for  clinical purposes. It should not be regarded as investigational or for  research.  (Note)  METHODOLOGY:  The Roche chinedu 4800 system uses automated extraction,  simultaneous amplification of HPV (L1 region) and beta-globin,  followed by  real time detection of fluorescent labeled HPV and beta  globin using specific oligonucleotide probes . The test specifically  identifies types HPV 16 DNA and HPV 18 DNA while concurrently  detecting the rest of the high risk types (31, 33, 35, 39, 45, 51,  52, 56, 58, 59, 66 or 68).     COMMENTS:  This test is not intended for use as a screening device  for women under age 30 with normal cervical   cytology.  Results should  be correlated with cytologic and histologic findings. Close clinical  followup is recommended.         Specimen Description Cervical Cells       Comment:         Performed and/or entered by:  [address] [address]       Narrative    Test performed by:  [facility]  [address], [place] [address]       If you have any questions, please call the clinic to make an appointment.    Sincerely,    Arline Casey MD

## 2019-12-19 NOTE — PROGRESS NOTES
Preceptor Attestation:   Patient seen, evaluated and discussed with the resident. I have verified the content of the note, which accurately reflects my assessment of the patient and the plan of care.  Supervising Physician:Buddy Leblanc MD  [facility]

## 2019-12-20 LAB
FINAL DIAGNOSIS: NORMAL
HPV 16 DNA: NEGATIVE
HPV 18 DNA: NEGATIVE
HPV SOURCE: NORMAL
OTHER HR HPV: NEGATIVE
SPECIMEN DESCRIPTION: NORMAL

## 2019-12-27 ENCOUNTER — TELEPHONE (OUTPATIENT)
Dept: FAMILY MEDICINE | Facility: CLINIC | Age: 29
End: 2019-12-27

## 2019-12-27 NOTE — TELEPHONE ENCOUNTER
[facility] Family Medicine phone call message- general phone call:    Reason for call: Patient is wondering if her pap/lab test are back and would like to know what the results are. Please call and advise.    Return call needed: Yes    OK to leave a message on voice mail? Yes    Primary language: English      needed? No    Call taken on December 27, 2019 at 4:32 PM by Preeti Rock

## 2019-12-27 NOTE — TELEPHONE ENCOUNTER
Called patient, went immediately to . Left  to call clinic back. HPV resulted normal, still awaiting rest of results. Will likely be ready by Monday or Tuesday. Rudolph GENTILE

## 2019-12-31 ENCOUNTER — RECORDS - HEALTHEAST (OUTPATIENT)
Dept: ADMINISTRATIVE | Facility: OTHER | Age: 29
End: 2019-12-31

## 2019-12-31 LAB
CYTOLOGY CVX/VAG DOC THIN PREP: NORMAL
HIGH RISK?: NO
HPV REFLEX?: NORMAL
LAB AP ABNORMAL BLEEDING: NO
LAB AP BIRTH CONTROL/HORMONES: NORMAL
LAB AP CERVICAL APPEARANCE: NORMAL
LAB AP PATIENT STATUS: NORMAL
LAB AP PREVIOUS ABNL: NORMAL
LAB AP PREVIOUS NORMAL: NORMAL
LAST MENS PERIOD: NORMAL
PATH REPORT.COMMENTS IMP SPEC: NORMAL
PATH REPORT.COMMENTS IMP SPEC: NORMAL
SPECIMEN ADEQUACY:: NORMAL

## 2020-01-02 NOTE — TELEPHONE ENCOUNTER
Patient returned call, notified of message below. Reinier Alcantar was able to verify that results were all normal. Patient has no questions or concerns.

## 2020-01-03 NOTE — RESULT ENCOUNTER NOTE
Results mailed to patient.    ~ Enrique LARRY (Chrissi) [facility]  [phone]

## 2020-01-06 ENCOUNTER — ALLIED HEALTH/NURSE VISIT (OUTPATIENT)
Dept: FAMILY MEDICINE | Facility: CLINIC | Age: 30
End: 2020-01-06
Payer: COMMERCIAL

## 2020-01-06 DIAGNOSIS — Z23 NEED FOR PROPHYLACTIC VACCINATION AND INOCULATION AGAINST INFLUENZA: Primary | ICD-10-CM

## 2020-01-15 ENCOUNTER — RECORDS - HEALTHEAST (OUTPATIENT)
Dept: ADMINISTRATIVE | Facility: OTHER | Age: 30
End: 2020-01-15

## 2020-01-16 ENCOUNTER — HOSPITAL ENCOUNTER (OUTPATIENT)
Dept: ULTRASOUND IMAGING | Facility: HOSPITAL | Age: 30
Discharge: HOME | End: 2020-01-16
Attending: FAMILY MEDICINE

## 2020-01-16 DIAGNOSIS — Z34.82 ENCOUNTER FOR SUPERVISION OF OTHER NORMAL PREGNANCY, SECOND TRIMESTER: ICD-10-CM

## 2020-01-21 NOTE — PROGRESS NOTES
"Vanna Min is a 29 year old  female who presents to clinic for a follow up OB visit. She is currently 23w1d with an estimated date of delivery of May 20, 2020 via 10 w US. She denies headache, chest pain, shortness of breath, abdominal pain/contractions, vaginal bleeding, or abnormal discharge. She has felt baby move.     New concerns today: no new concerns today. She has had excess weight gain in pregnancy, is up 11 lbs since last visit with 30 lbs total gained. Did gain nearly 70 lbs with her last pregnancy.     Physical exam:  /71   Pulse 100   Temp 97.8  F (36.6  C) (Oral)   Resp 18   Ht 1.645 m (5' 4.75\")   Wt 76.1 kg (167 lb 12.8 oz)   LMP 08/10/2019 (LMP Unknown)   SpO2 97%   BMI 28.14 kg/m      General: alert, female in no acute distress  Abdomen: gravid uterus appropriate for gestation age at 25 cm above pubic symphysis, non-tender, FHTs 140s  Extremities: no edema    Assessment/Plan:  Patient Active Problem List   Diagnosis     Screening for cervical cancer     Encounter for supervision of other normal pregnancy, second trimester     Excessive weight gain during pregnancy in second trimester     Fetal survey ultrasound done 2020; normal interval growth however limited view of fetal spine, Targeted follow up US ordered today.    Discussed excessive weight gain in pregnancy. Reviewed ways to make healthier food choices and ways to incorporate exercise into her daily routine.     Patient is not planning to complete childbirth education classes.     Discussed pre-term labor signs and symptoms and when to call/come in.     Discussed expectations for gestational diabetes screen to be completed at next visit.    Follow up in 4 weeks for routine prenatal visit.     Arline Casey MD  [facility] Family Medicine Resident    Precepted with: Kelli Jarvis MD          "

## 2020-01-23 ENCOUNTER — OFFICE VISIT (OUTPATIENT)
Dept: FAMILY MEDICINE | Facility: CLINIC | Age: 30
End: 2020-01-23
Payer: COMMERCIAL

## 2020-01-23 VITALS
HEART RATE: 100 BPM | TEMPERATURE: 97.8 F | RESPIRATION RATE: 18 BRPM | HEIGHT: 65 IN | OXYGEN SATURATION: 97 % | BODY MASS INDEX: 27.96 KG/M2 | WEIGHT: 167.8 LBS | DIASTOLIC BLOOD PRESSURE: 71 MMHG | SYSTOLIC BLOOD PRESSURE: 131 MMHG

## 2020-01-23 DIAGNOSIS — Z34.82 ENCOUNTER FOR SUPERVISION OF OTHER NORMAL PREGNANCY, SECOND TRIMESTER: Primary | ICD-10-CM

## 2020-01-23 DIAGNOSIS — O26.02 EXCESSIVE WEIGHT GAIN DURING PREGNANCY IN SECOND TRIMESTER: ICD-10-CM

## 2020-01-23 DIAGNOSIS — Z34.82 ENCOUNTER FOR SUPERVISION OF OTHER NORMAL PREGNANCY, SECOND TRIMESTER: ICD-10-CM

## 2020-01-23 ASSESSMENT — MIFFLIN-ST. JEOR: SCORE: 1478.05

## 2020-01-23 NOTE — PATIENT INSTRUCTIONS
[facility] Information  If you have any further concerns or wish to schedule another appointment, please call our office at [phone] during normal business hours (8-5, M-F).     For uncomplicated pregnancies, you will be seeing your doctor once a month until you are 28 weeks, then you will see your doctor twice a month. You will begin weekly visits at 36 weeks until you deliver.     If you have urgent medical questions that cannot wait, you may call [phone] at any time of day.     If you have a medical emergency, please call [phone].    When to call or come in to the hospital    If you notice a decrease in your baby's movement.     If your begin to experience contractions that are 5 minutes or less apart and lasting for over 45 seconds, or if contractions are becoming more painful.    If you have any bleeding or leakage of fluids.     If you have a headache not resolved with tylenol, right upper abdominal pain, or sudden onset of swelling.    You know your body best. Never hesitate to call or go to the hospital if something doesn't feel right!    [facility]   Address: [address] [place] [address]. [place] [address]   Phone: [phone]  Gestational Diabetes Screen  At your next visit, you will be screened for gestational diabetes. You will drink a sugary drink and then have your blood drawn to see how your body responds to a sugar load. This test takes about an hour to complete - please plan your schedule accordingly!  The Benefits of Breastfeeding   Breastfeeding gives your new baby the very best start. It supplies nutrition, comfort, and love. Experts agree: Breastfeeding is the healthiest choice for babies during the first year of life and beyond. It s healthy for Mom, too. Breastfeeding may be challenging at first. But with support, you and your baby can succeed together.   Healthiest for Baby   Breastmilk is the ideal food for babies. It has all the nutrients your little one needs to  grow healthy and strong. Here are some of the many benefits for your baby:     Breastfeeding provides contact that babies love. Frequent skin-to-skin time with Mom is calming and comforting.     Breastmilk is full of antibodies. These are substances that help your baby fight infection. Breastmilk reduces your baby's risk of respiratory illnesses, ear infections, and diarrhea.     Breastfeeding reduces your baby s risk of allergies, colds, and many other diseases.     Breastmilk changes as your baby grows, meeting her changing needs.     Breastmilk is easy for your baby to digest.     Breastmilk contains DHA, a fat that is good for your baby s developing brain and eyes.     Breastmilk decreases the risk of sudden infant death syndrome (SIDS).  Healthiest for Mom   For many women, breastfeeding is an amazing experience. It creates a strong bond between mother and baby. Other benefits for Mom include:     You can feel proud knowing that your baby is growing healthy and strong because of your milk.     Breastmilk is convenient. It s free, clean, and always the right temperature.     Breastfeeding burns calories. This can help you lose pregnancy weight faster.     Breastfeeding releases hormones that contract the uterus. This helps the uterus return to its normal size after childbirth.     Mothers who breastfeed have a decreased risk of ovarian and breast cancers.  There are many people who can help you learn to breastfeed. A lactation consultant is a healthcare provider specially trained to help breastfeeding moms. A lactation consultant will visit you after delivery and is available after your baby is born - if you'd like to meet with lactation prior to delivery, let your doctor know!

## 2020-01-23 NOTE — PROGRESS NOTES
Preceptor Attestation:   Patient seen, evaluated and discussed with the resident. I have verified the content of the note, which accurately reflects my assessment of the patient and the plan of care.  Supervising Physician:Kelli Jarvis MD  [facility]

## 2020-01-24 ENCOUNTER — TELEPHONE (OUTPATIENT)
Dept: FAMILY MEDICINE | Facility: CLINIC | Age: 30
End: 2020-01-24

## 2020-01-24 NOTE — TELEPHONE ENCOUNTER
Called patient and left detailed VM on identified VM that spine on US was normal. Requested call back with questions/concerns.

## 2020-01-24 NOTE — TELEPHONE ENCOUNTER
[facility] Family Medicine phone call message- patient requesting results:    Test: Lab and Ultrasound    Date of test: 01/23/2020     Additional Comments: Wants to know if US results are back    OK to leave a message on voice mail? Yes      Primary language: English      needed? No    Call taken on January 24, 2020 at 11:35 AM by Marcelo Dixon

## 2020-01-24 NOTE — TELEPHONE ENCOUNTER
Please call and let Vnana know that the spine looked normal on the ultrasound.    Thanks!    Arline Casey MD

## 2020-02-10 NOTE — PROGRESS NOTES
Vanna Min is a 29 year old  female who presents to clinic for a follow up OB visit. She is currently 25w5d with an estimated date of delivery of May 20, 2020 via 10w US. She denies headache, chest pain, shortness of breath, abdominal pain/contractions, vaginal bleeding, or abnormal discharge. She has felt baby move.     New concerns today: Some ankle swelling, mild heart burn     History of excess weight gain with first pregnancy (nearly 70 lbs). She has not gained since last visit with 30 total gained this pregnancy.     Physical exam:  /76   Pulse 87   Temp 97.6  F (36.4  C) (Oral)   Resp 17   Wt 75.8 kg (167 lb 3.2 oz)   LMP 08/10/2019 (LMP Unknown)   SpO2 100%   BMI 28.04 kg/m      General: alert, female in no acute distress  Abdomen: gravid uterus appropriate for gestation age at 23 cm above pubic symphysis, non-tender, FHTs 150's  Extremities: no edema    Assessment/Plan:  Patient Active Problem List   Diagnosis     Screening for cervical cancer     Encounter for supervision of other normal pregnancy, second trimester     Excessive weight gain during pregnancy in second trimester       Information given on gestational diabetes. 1 hour glucose tolerance test today, in addition to CBC and syphilis.     Blood type AB pos. Rhogam not indicated.  TDAP (26-36 wks)  Discussed signs and symptoms of  labor.   Evaluate for decreased fetal movement.     Follow up in 4 weeks for routine prenatal visit.     Devin Madison, MS3    I was present with the medical student who participated in the service and in the documentation of this note. I have verified the history and personally performed the physical exam and medical decision making, and have verified the content of the note, which accurately reflects my assessment of the patient and the plan of care.    Arline Casey MD  [facility] Family Medicine Resident    Precepted with: Kelli Jarvis MD

## 2020-02-11 ENCOUNTER — OFFICE VISIT (OUTPATIENT)
Dept: FAMILY MEDICINE | Facility: CLINIC | Age: 30
End: 2020-02-11
Payer: COMMERCIAL

## 2020-02-11 VITALS
TEMPERATURE: 97.6 F | HEART RATE: 87 BPM | OXYGEN SATURATION: 100 % | SYSTOLIC BLOOD PRESSURE: 110 MMHG | DIASTOLIC BLOOD PRESSURE: 76 MMHG | RESPIRATION RATE: 17 BRPM | WEIGHT: 167.2 LBS | BODY MASS INDEX: 28.04 KG/M2

## 2020-02-11 DIAGNOSIS — Z34.82 ENCOUNTER FOR SUPERVISION OF OTHER NORMAL PREGNANCY, SECOND TRIMESTER: Primary | ICD-10-CM

## 2020-02-11 DIAGNOSIS — O26.02 EXCESSIVE WEIGHT GAIN DURING PREGNANCY IN SECOND TRIMESTER: ICD-10-CM

## 2020-02-11 LAB
GLU GEST SCREEN 1HR 50G: 98 (ref 0–129)
HCT VFR BLD AUTO: 34.9 % (ref 35–47)
HEMOGLOBIN: 11.2 G/DL (ref 11.7–15.7)
MCH RBC QN AUTO: 31.6 PG (ref 26.5–35)
MCHC RBC AUTO-ENTMCNC: 32.1 G/DL (ref 32–36)
MCV RBC AUTO: 98.7 FL (ref 78–100)
PLATELET # BLD AUTO: 368 K/UL (ref 150–450)
RBC # BLD AUTO: 3.54 M/UL (ref 3.8–5.2)
WBC # BLD AUTO: 11 K/UL (ref 4–11)

## 2020-02-12 LAB — TREPONEMA ANTIBODY (SYPHILIS): NEGATIVE

## 2020-02-14 NOTE — RESULT ENCOUNTER NOTE
Called patient with results.     PEGGY Pan (Mackenzie) [facility] [address] [place] [address] [phone]

## 2020-02-16 ENCOUNTER — HEALTH MAINTENANCE LETTER (OUTPATIENT)
Age: 30
End: 2020-02-16

## 2020-03-04 ENCOUNTER — TELEPHONE (OUTPATIENT)
Dept: FAMILY MEDICINE | Facility: CLINIC | Age: 30
End: 2020-03-04

## 2020-03-04 NOTE — TELEPHONE ENCOUNTER
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 She states she is part of a pregnancy program and she will support the plan of care since she will be working with patient. If any questions it's ok to contact her.     Return call needed: No    OK to leave a message on voice mail? Yes    Primary language: English      needed? No    Call taken on March 4, 2020 at 2:06 PM by Jami Rebolledo

## 2020-03-12 ENCOUNTER — OFFICE VISIT (OUTPATIENT)
Dept: FAMILY MEDICINE | Facility: CLINIC | Age: 30
End: 2020-03-12
Payer: COMMERCIAL

## 2020-03-12 VITALS
BODY MASS INDEX: 29.98 KG/M2 | SYSTOLIC BLOOD PRESSURE: 100 MMHG | HEART RATE: 96 BPM | RESPIRATION RATE: 20 BRPM | HEIGHT: 64 IN | WEIGHT: 175.6 LBS | OXYGEN SATURATION: 98 % | TEMPERATURE: 97.5 F | DIASTOLIC BLOOD PRESSURE: 65 MMHG

## 2020-03-12 DIAGNOSIS — Z34.82 ENCOUNTER FOR SUPERVISION OF OTHER NORMAL PREGNANCY, SECOND TRIMESTER: Primary | ICD-10-CM

## 2020-03-12 ASSESSMENT — MIFFLIN-ST. JEOR: SCORE: 1501.52

## 2020-03-12 NOTE — PROGRESS NOTES
"Vanna Min is a 29 year old  female who presents to clinic for a follow up OB visit. She is currently 30w1d with an estimated date of delivery of May 20, 2020 via 10w US. She denies headache, chest pain, shortness of breath, abdominal pain/contractions, vaginal bleeding, or abnormal discharge. She has felt baby move.     New concerns today: Mild swelling in feet. Some increased leg pain/heaviness, bilateral.     Physical exam:  /65   Pulse 96   Temp 97.5  F (36.4  C)   Resp 20   Ht 1.626 m (5' 4\")   Wt 79.7 kg (175 lb 9.6 oz)   LMP 08/10/2019 (LMP Unknown)   SpO2 98%   BMI 30.14 kg/m      General: alert, female in no acute distress  Abdomen: gravid uterus appropriate for gestation age at 29.5 cm above pubic symphysis, non-tender, FHTs 147  Extremities: no edema    Assessment/Plan:  Patient Active Problem List   Diagnosis     Screening for cervical cancer     Encounter for supervision of other normal pregnancy, second trimester     Excessive weight gain during pregnancy in second trimester     Flu 2020. Will give TDAP today.   Passed 1hr GTT.  AB positive. Rhogam not indicated.    Patient plans to breastfeed.   Discussed post-partum contraception options.  Discussed when to call/come in. Wants to do an epidural with this labor.    Follow up in 2 weeks for routine prenatal visit.     Devin Madison, MS3    I was present with the medical student who participated in the service and in the documentation of this note. I have verified the history and personally performed the physical exam and medical decision making, and have verified the content of the note, which accurately reflects my assessment of the patient and the plan of care.    Arline Casey MD  [facility] Family Medicine Resident    Precepted with: Alee Gomes,           "

## 2020-03-13 ASSESSMENT — PATIENT HEALTH QUESTIONNAIRE - PHQ9: SUM OF ALL RESPONSES TO PHQ QUESTIONS 1-9: 0

## 2020-03-13 NOTE — PROGRESS NOTES
Preceptor Attestation:   Patient seen, evaluated and discussed with the resident. I have verified the content of the note, which accurately reflects my assessment of the patient and the plan of care.  Supervising Physician:Alee Gomes DO [facility]

## 2020-04-02 ENCOUNTER — VIRTUAL VISIT (OUTPATIENT)
Dept: FAMILY MEDICINE | Facility: CLINIC | Age: 30
End: 2020-04-02
Payer: COMMERCIAL

## 2020-04-02 DIAGNOSIS — Z34.83 ENCOUNTER FOR SUPERVISION OF OTHER NORMAL PREGNANCY IN THIRD TRIMESTER: Primary | ICD-10-CM

## 2020-04-02 DIAGNOSIS — O26.86 PUPP (PRURITIC URTICARIAL PAPULES AND PLAQUES OF PREGNANCY): ICD-10-CM

## 2020-04-02 RX ORDER — HYDROCORTISONE 2.5 %
CREAM (GRAM) TOPICAL
Qty: 30 G | Refills: 1 | Status: SHIPPED | OUTPATIENT
Start: 2020-04-02 | End: 2020-05-12

## 2020-04-02 NOTE — PROGRESS NOTES
Preceptor Attestation:   Patient discussed with the resident. I have verified the content of the note, which accurately reflects my assessment of the patient and the plan of care.  Supervising Physician:Serena Romero MD  [facility]

## 2020-04-02 NOTE — PROGRESS NOTES
"Family Medicine Telephone Visit Note         Telephone Visit Consent   Patient was verbally read the following and verbal consent was obtained.  \"I understand that I may revoke this request for a phone visit at any time.  This consent will automatically  3 months from the signed date and time.\"    Name person giving consent:  Patient   Date verbal consent given:  2020  Time verbal consent given:  1:58 PM    Chief Complaint   Patient presents with     Prenatal Care     Have some questions     Current Outpatient Medications   Medication Sig Dispense Refill     hydrocortisone 2.5 % cream Apply topically nightly as needed 30 g 1     Prenatal Vit-Fe Fumarate-FA (PRENATAL MULTIVITAMIN W/IRON) 27-0.8 MG tablet Take 1 tablet by mouth daily 90 tablet 3     Allergies   Allergen Reactions     Sulfa Drugs           HPI   Patients name: Vanna  Appointment start time: 2:05    Vanna Min is a 29 year old  female who presents to clinic for a follow up OB visit. She is currently 33w1d with an estimated date of delivery of May 20, 2020 via 10wk US. She denies headache, chest pain, shortness of breath, abdominal pain/contractions, vaginal bleeding, or abnormal discharge. She continues to feel baby move.     New concerns today: small rash on stomach. Had PUPPs with prior pregnancy.  7 or more bumps below umbilicus and to the left. Not painful. Previously improved with ointment. Has been trying not to scratch it.     Asking if she should cancel baby shower.     Also asking if we can provide gloves and face mask for her.    Asking about GBS screening, was GBS positive with her last child.    Asking about visitor restrictions in hospital.     OB History    Para Term  AB Living   2 1 1 0 0 1   SAB TAB Ectopic Multiple Live Births   0 0 0 0 1      # Outcome Date GA Lbr Steven/2nd Weight Sex Delivery Anes PTL Lv   2 Current            1 Term 12/01/15 40w0d  2.92 kg (6 lb 7 oz) F Vag-Spont None N EVAN      " Birth Comments: GBS +,  resuscitation: intubation, suctioning,PPV nasopharynx,mouth,nose. Suction below vocal cords- duration in 1 min      Name: Rita Min-Joe      Apgar1: 5  Apgar5: 9           Assessment and Plan   1. Encounter for supervision of other normal pregnancy in third trimester  Vanna Min is a 29 year old  female at 33w1d. Multiple appropriate questions asked: recommended cancelling in-person baby shower. Unfortunately, unable to provide mask/gloves to patients due to limited supplies. Discussed current 1 visitor restriction at hospital.     Flu 2020. TDAP 3/12/2020.  Passed 1hr GTT.  AB positive. Rhogam not indicated.    Patient plans to formula feeding.   Discussed when to call/come in. Wants to do an epidural with this labor.     Follow up in 3 weeks for routine prenatal visit with GBS and verify vertex presentation on bedside US.     2. PUPP (pruritic urticarial papules and plaques of pregnancy)  Could also consider H2 blocker if itching worsens.   - hydrocortisone 2.5 % cream; Apply topically nightly as needed  Dispense: 30 g; Refill: 1    After Visit Information:  Will print and mail AVS     Appointment end time: 2:16  This is a telephone visit that took 11 minutes.      Clinician location:  [facility] [place]    Precepted with Dr. Romero

## 2020-04-02 NOTE — PROGRESS NOTES
Vanna Min is a 29 year old  female who presents to clinic for a follow up OB visit. She is currently 33w1d with an estimated date of delivery of May 20, 2020 via 10wk US. She denies headache, chest pain, shortness of breath, abdominal pain/contractions, vaginal bleeding, or abnormal discharge. She continues to feel baby move.     New concerns today: small rash on stomach. Had PUPPs with prior pregnancy.  7 or more bumps below umbilicus and to the left. Not painful. Previously improved with ointment. Has been trying not to scratch it.     Asking if she should cancel baby shower.     Also asking if we can provide gloves and face mask for her.    Asking about GBS screening, was positive with her last child.    Asking about visitor restrictions in hospital.     OB History    Para Term  AB Living   2 1 1 0 0 1   SAB TAB Ectopic Multiple Live Births   0 0 0 0 1      # Outcome Date GA Lbr Steven/2nd Weight Sex Delivery Anes PTL Lv   2 Current            1 Term 12/01/15 40w0d  2.92 kg (6 lb 7 oz) F Vag-Spont None N EVAN      Birth Comments: GBS +,  resuscitation: intubation, suctioning,PPV nasopharynx,mouth,nose. Suction below vocal cords- duration in 1 min      Name: Rita Min-Joe      Apgar1: 5  Apgar5: 9       Assessment/Plan:  Flu 2020. TDAP 3/12/2020.  Passed 1hr GTT.  AB positive. Rhogam not indicated.    Prior   Patient plans to formula feeding.   Discussed post-partum contraception options.  Discussed when to call/come in. Wants to do an epidural with this labor.     Follow up in 2 weeks for routine prenatal visit.

## 2020-04-02 NOTE — PATIENT INSTRUCTIONS
[facility] Information  If you have any further concerns or wish to schedule another appointment, please call our office at [phone] during normal business hours (8-5, M-F).     For uncomplicated pregnancies, you will be seeing your doctor once a month until you are 28 weeks, then you will see your doctor twice a month. You will begin weekly visits at 36 weeks until you deliver.     If you have urgent medical questions that cannot wait, you may call [phone] at any time of day.     If you have a medical emergency, please call [phone].    When to call or come in to the hospital  If you notice a decrease in your baby's movement.   If your begin to experience contractions that are 5 minutes or less apart and lasting for over 45 seconds, or if contractions are becoming more painful.  If you have any bleeding or leakage of fluids.   If you have a headache not resolved with tylenol, right upper abdominal pain, or sudden onset of swelling.  You know your body best. Never hesitate to call or go to the hospital if something doesn't feel right!    Preparing for the hospital  You re likely feeling anxious as your child s birth approaches. This is normal. To give yourself some peace of mind, pack a bag 3-4 weeks before your due date. Here is a list of things to remember:  Personal care items like toothbrush, hair brush, lip balm, lotion, shampoo, glasses, contacts  Nightgown, bathrobe, slippers  Several pairs of underwear  Comfortable clothes for you to wear home  Camera with new batteries  Cell phone and   Insurance information and any other paperwork needed for your hospital stay  Clothes for baby to wear home  An infant, rear-facing car seat for bringing home your baby (this is required by law)  Managing Labor Pain   There are many ways to manage pain during labor. It can often be done with no anesthesia or strong pain medications. Talk to your health care provider about any options you would like to  "explore.   Help from Relaxation  Some of these are learned in special classes. Your health care provider can help you find classes. The hospital has a tub you can use during early labor. These methods may be of help to you:   Breathing techniques   A warm tub between contractions   Massage and therapeutic touch by your support person or labor    Reading materials that are comforting or inspiring   Music that is soothing   Hypnosis   Acupuncture and acupressure   Heat and cold applicatio  Help From Analgesics   Analgesics are mild medications that reduce pain. They can be used along with some relaxation methods. They can give you pain relief without total loss of feeling. They may lessen the pain of strong contractions. You may feel well enough to nap between contractions. They have little effect on your baby if given early in labor. This may be done by injection or by IV.   Help from Inhaled Medications  Nitrous oxide (\"laughing gas\") mixed with oxygen is another option for anesthesia. This is administered by breathing through a mask that you can take off or put on when you would like. It is safe for you and your baby during labor. Women have various responses to pain relief from this method, typically you will feel less bothered by the pain.   Help From Anesthetics   Anesthesia involves blockage of all feeling including pain. It can be given in the form of local anesthesia or general anesthesia.  Anesthesia is a type of medication to prevent pain. It is often used in labor. It may numb only one region of your body. This is called regional anesthesia. Or it may let you sleep during surgery. This is called general anesthesia. This type of medication is given by a trained specialist. When possible, regional anesthesia will be used. This is so you can be awake during your baby s birth.   Regional Anesthesia   Regional anesthesia may be used to numb your lower body for a vaginal or  birth. It does not go " into your bloodstream. This means that little or none of it will reach your baby. There are two kinds:   Epidural. This is most often given while you sit up or lie on your side. A needle with a flexible tube (catheter) is put in your lower back. The needle is then removed. The anesthetic is sent through the catheter. A pump may be attached. This gives you a constant level of anesthetic. An epidural often only partly affects muscle control. This means you should still be able to push for a vaginal birth.   Spinal. This is most often given in one dose right before delivery. It acts fast. You may sit up or lie down when it is injected. It may affect muscle control in your lower body. This includes the ability to push.  General Anesthesia   General anesthesia lets you sleep and keeps you free of pain during surgery. It may be used for a . It may be given as an injection. It may be given as an inhaled gas. Or it may be given as both. Delivery often occurs before the medication has reached the baby.

## 2020-04-20 PROBLEM — Z34.83 ENCOUNTER FOR SUPERVISION OF OTHER NORMAL PREGNANCY IN THIRD TRIMESTER: Status: ACTIVE | Noted: 2019-10-16

## 2020-04-21 ENCOUNTER — OFFICE VISIT (OUTPATIENT)
Dept: FAMILY MEDICINE | Facility: CLINIC | Age: 30
End: 2020-04-21
Payer: COMMERCIAL

## 2020-04-21 VITALS
TEMPERATURE: 98 F | WEIGHT: 182.2 LBS | HEART RATE: 95 BPM | DIASTOLIC BLOOD PRESSURE: 72 MMHG | OXYGEN SATURATION: 96 % | RESPIRATION RATE: 22 BRPM | SYSTOLIC BLOOD PRESSURE: 114 MMHG | BODY MASS INDEX: 31.1 KG/M2 | HEIGHT: 64 IN

## 2020-04-21 DIAGNOSIS — Z34.83 ENCOUNTER FOR SUPERVISION OF OTHER NORMAL PREGNANCY IN THIRD TRIMESTER: Primary | ICD-10-CM

## 2020-04-21 ASSESSMENT — MIFFLIN-ST. JEOR: SCORE: 1531.45

## 2020-04-21 NOTE — PROGRESS NOTES
"Vanna Min is a 29 year old  female who presents to clinic for a follow up OB visit. She is currently 35w6d with an estimated date of delivery of May 20, 2020 via 10w US. She denies headache, chest pain, shortness of breath, abdominal pain/contractions, vaginal bleeding, or abnormal discharge. She has felt baby move.     New concerns today: PUPPs - using steroid cream sparingly. Weight gain in pregnancy has been in excess of recommended amount, however not as significant as her last pregnancy where she gained 75-80 lbs.    OB History    Para Term  AB Living   2 1 1 0 0 1   SAB TAB Ectopic Multiple Live Births   0 0 0 0 1      # Outcome Date GA Lbr Steven/2nd Weight Sex Delivery Anes PTL Lv   2 Current            1 Term 12/01/15 40w0d  2.92 kg (6 lb 7 oz) F Vag-Spont None N EVAN      Birth Comments: GBS +,  resuscitation: intubation, suctioning,PPV nasopharynx,mouth,nose. Suction below vocal cords- duration in 1 min      Name: Rita Min-Joe      Apgar1: 5  Apgar5: 9     Physical exam:  /72   Pulse 95   Temp 98  F (36.7  C) (Oral)   Resp 22   Ht 1.626 m (5' 4\")   Wt 82.6 kg (182 lb 3.2 oz)   LMP 08/10/2019 (LMP Unknown)   SpO2 96%   BMI 31.27 kg/m      General: alert, female in no acute distress  Abdomen: gravid uterus appropriate for gestation age at 36 cm above pubic symphysis, non-tender, FHTs 140s, Leopold's maneuver reveals cephalic positioning - confirmed with bedside ultrasound  Extremities: no edema    Assessment/Plan:  Patient Active Problem List   Diagnosis     Screening for cervical cancer     Encounter for supervision of other normal pregnancy in third trimester     Excessive weight gain during pregnancy in third trimester     Reviewed expectations for final month of pregnancy and when to call/come in.  Group B strep discussed and culture collected.   Discussed pre-term labor signs and symptoms and when to call/come in.    Expecting girl.  Discussed " labor pain options: Epidural  Planning on formula feeding  Reviewed visitor restriction.     Follow up in 1 week for routine prenatal visit.     Arline Casey MD  [facility] Family Medicine Resident    Precepted with: Helio Suarez MD

## 2020-04-21 NOTE — PROGRESS NOTES
Vanna Min is a 29 year old  female who presents to clinic for a follow up OB visit. She is currently 35w6d with an estimated date of delivery of May 20, 2020 via 10w US. She denies headache, chest pain, shortness of breath, abdominal pain/contractions, vaginal bleeding, or abnormal discharge. She has felt baby move.     New concerns today: ***    OB History    Para Term  AB Living   2 1 1 0 0 1   SAB TAB Ectopic Multiple Live Births   0 0 0 0 1      # Outcome Date GA Lbr Steven/2nd Weight Sex Delivery Anes PTL Lv   2 Current            1 Term 12/01/15 40w0d  2.92 kg (6 lb 7 oz) F Vag-Spont None N EVAN      Birth Comments: GBS +,  resuscitation: intubation, suctioning,PPV nasopharynx,mouth,nose. Suction below vocal cords- duration in 1 min      Name: Rita Min-Joe      Apgar1: 5  Apgar5: 9       Physical exam:  LMP 08/10/2019 (LMP Unknown)     General: alert, female in no acute distress  Abdomen: gravid uterus appropriate for gestation age at *** cm above pubic symphysis, non-tender, FHTs ***, Leopold's maneuver reveals *** positioning  Gyn: ***cm/***%/***, *** discharge  Extremities: no edema    Assessment/Plan:  Patient Active Problem List   Diagnosis     Screening for cervical cancer     Encounter for supervision of other normal pregnancy, second trimester     Excessive weight gain during pregnancy in second trimester       Reviewed expectations for final month of pregnancy and when to call/come in.  Group B strep discussed and culture collected.   Discussed pre-term labor signs and symptoms and when to call/come in.     If you notice a decrease in your baby's movement. As a rule, if she perceives fewer than 10 movements in two hours at rest    If your begin to experience contractions that are 5 minutes or less apart and lasting for over 45 seconds, or if contractions are becoming more painful.    If you have any bleeding or leakage of fluids.     If you have a headache  "not resolved with tylenol, right upper abdominal pain, or sudden onset of swelling.  Discussed labor pain options:  Relaxation:     Breathing techniques     A warm tub between contractions     Massage and therapeutic touch by your support person or labor    IV Analgesics   They may lessen the pain of strong contractions.  They have little effect on your baby if given early in labor. This may be done by injection or by IV.   Inhaled Medications  Nitrous oxide (\"laughing gas\") mixed with oxygen is another option for anesthesia. This is administered by breathing through a mask that you can take off or put on when you would like. It is safe for you and your baby during labor.   Regional Anesthesia     Epidural. A needle with a flexible tube (catheter) is put in your lower back. This gives you a constant level of anesthetic. An epidural often only partly affects muscle control. This means you should still be able to push for a vaginal birth.    Follow up in 1 week for routine prenatal visit.     Schedule weekly visits x 5 (37 to 41 wks)    Arline Casey MD  [facility] Family Medicine Resident    Precepted with: {pvpreceptors:[id number]}          "

## 2020-04-21 NOTE — PATIENT INSTRUCTIONS
[facility] Information  If you have any further concerns or wish to schedule another appointment, please call our office at [phone] during normal business hours (8-5, M-F).     For uncomplicated pregnancies, you will be seeing your doctor once a month until you are 28 weeks, then you will see your doctor twice a month. You will begin weekly visits at 36 weeks until you deliver.     If you have urgent medical questions that cannot wait, you may call [phone] at any time of day.     If you have a medical emergency, please call 911.    When to call or come in to the hospital    If you notice a decrease in your baby's movement.     If your begin to experience contractions that are 5 minutes or less apart and lasting for over 45 seconds, or if contractions are becoming more painful.    If you have any bleeding or leakage of fluids.     If you have a headache not resolved with tylenol, right upper abdominal pain, or sudden onset of swelling.    You know your body best. Never hesitate to call or go to the hospital if something doesn't feel right!    [facility]   Address: [address] [place] [address]. [place] [address]   Phone: [phone]    Preparing for the hospital  You re likely feeling anxious as your child s birth approaches. This is normal. To give yourself some peace of mind, pack a bag 3-4 weeks before your due date. Here is a list of things to remember:    Personal care items like toothbrush, hair brush, lip balm, lotion, shampoo, glasses, contacts    Nightgown, bathrobe, slippers    Several pairs of underwear    Comfortable clothes for you to wear home    Camera with new batteries    Cell phone and     Insurance information and any other paperwork needed for your hospital stay    Clothes for baby to wear home    An infant, rear-facing car seat for bringing home your baby (this is required by law)  What Is Group B Strep?   Group B strep (streptococcus) is a common bacteria. It  can grow in a woman s vagina, rectum, or urinary tract. It is almost always harmless in adults. But in rare cases, a woman who has group B strep can infect her baby during the birth. Infection can cause serious illness in the . The good news is that treating the mother during labor reduces the risk of the baby becoming infected. And if a  is infected, the infection can be treated. You will be screened for Group B strep at 35-36 weeks gestation.  Facts about group B strep   Learning more about group B strep can help you understand how testing and treatment can help. Here are some basic facts about group B strep:     It is not a sexually transmitted disease.     It is not the same as strep throat. (This is caused by group A strep.)     It often has no symptoms and may cause no problems in adults.     Group B strep can be transmitted during vaginal delivery. It cannot be passed during  (surgical) birth.     A mother with group B strep rarely infects her . (Infection occurs only about 1% to 2% of the time.)     When a mother is treated during labor and delivery, her baby almost never becomes infected.     Certain factors during pregnancy increase the risk of a baby becoming infected.  Possible effects on your baby   Group B strep can infect the blood. It can also cause inflammation of the baby s lungs, brain, or spinal cord. Long-term effects can include blindness, deafness, mental retardation, or cerebral palsy. And in rare cases, infection causes death. Infection is most often detected soon after the baby is born.   How your baby may become infected   Group B strep often lives in the vagina or rectum. If the amniotic sac breaks early, bacteria from the vagina can travel to the uterus, reaching the baby. Or, as the baby passes through the birth canal, it can come in contact with the bacteria. In rare cases, group B strep can also be passed to the baby after delivery. This is called  late-onset group B strep. The source of this type of infection is not well understood. But some experts believe that it happens if the baby is exposed to group B strep in the home, from the parents or siblings, or in the community.   What increases the risk?   Certain risk factors increase the chance that a baby will be infected. They include:     Breaking or leaking of the amniotic sac earlier than 37 weeks gestation     Labor earlier than 37 weeks gestation     Breaking of the amniotic sac more than 18 hours before labor begins     Fever during labor     A urinary tract infection with group B strep at any point in the pregnancy     A previous baby born with a group B strep infection    BaBaby Feeding in the Hospital: Information, Support and      Resources    As As you prepare for the birth of your child, you will want to consider options for feeding your baby cluding breast-feeding and/or baby formula. The American Academy of Pediatrics recommends exclusive breast-feeding for the first six months (although any amount of breast-feeding is beneficial).  However, we also understand that breast-feeding is  a personal choice and not for everyone. Whether or not you choose to breast-feed, your decision will be respected by our staff.        There are numerous benefits of breast-feeding; here are a few to consider:    Provides antibodies to protect your baby from infections and diseases    The cost: formula can cost over $1,500 per year    Convenience, no warming up or sterilizing bottles and supplies    The physical contact with breastfeeding can make babies feel secure, warm and comforted        What ever my feeding choice, what can I expect after I deliver my baby?    Your baby will usually be placed skin-to-skin immediately following birth. The skin to skin contact between you and your baby will be a special and memorable time. The bonding and attachment comforts your baby and has a positive effect on baby s brain  development.     Having your baby  room in  with you also helps you start to learn your baby s body rhythms and sleep cycle.      You will also begin to learn your baby s cues (signals) that he or she is ready to feed.        When do I start to feed my baby?         As soon as possible after your baby s birth, you will be encouraged to begin feeding. In the first couple of weeks, your baby will eat often. Breastfeeding babies usually eat at least 8 times in 24 hours. Babies fed formula usually eat at least 7 times in 24 hours.           Breast-feeding tips:    Get comfortable and use pillows for support.    Have your baby at the level of your breast, facing you,  tummy to tummy.       Touch your nipple to your baby s lips so your baby s mouth opens wide (rooting reflex).  Aim the nipple toward the roof of your baby s mouth. When your baby opens his or her mouth, pull your baby toward your breast to help your baby  latch on  to your nipple and much of the areola area.    Hand expressing your breast milk can assist with latching your baby to your breast, if needed.    Ask for help, breastfeeding may seem awkward or uncomfortable at first, this is normal. There are numerous resources available at [facility], clinics and beyond.     If your goal is to exclusively breastfeed, avoid using any formula or artificial nipples (including bottles and pacifiers) while you and your baby are learning to breastfeed unless there is a medical reason.       Mixing breastfeeding and formula can interfere with how you begin building your milk supply. It can impact how you and your baby learn to breastfeeding together and alter the natural growth of  good  bacteria in your baby s stomach.  Breast-feeding tips (cont.)    Delay a pacifier or a bottle in the first few weeks until breastfeeding is well established. This is often around 3 weeks of age.    Ask your nurse to show you how to hand express. Breast milk can be kept in the  refrigerator orfreezer for later use.     Hospital Resources:  [facility] Lactation Clinics located at [facility], [facility] and [facility]  Call: [phone].    Inpatient support    Outpatient appointments    Telephone consultation    Breast-feeding classes available through [employer]      Online Resources:    [email or web address] sign up for free online weekly e-mail    [email or web address]    Breastfeedingmadesimple.[email or web address]    [employer] (La [employer]eche League)    Normalfed.com    Womenshealth.[email or web address]    Workandpump.com    Breast-feeding Supplies & Pumps:  Talk to your insurance provider or WIC (Women, Infants and Children) to learn more about options available to you. Recent health insurance changes may include additional coverage for supplies and pumps.    Public Health:  Women, Infants and Children Nutrition program (WIC): provides breast-feeding support and education in addition to formal feeding moms. 843-WDU-0798 or [email or web address]    Family Health Home Visiting: Public [facility] Nurse home visits are available. Talk to your provider to see if you qualify. Most [facility] have a program available.    Additional Resources:  La Leche League is an international, nonprofit, nonsectarian organization offering information, education, and support to mothers who want to breast-feed their babies. Local groups offer phone help and monthly meetings. Visit [employer] or [employer] and us the  Find local support  drop down menu or click on the  Resources  tab.    [place] Breastfeeding Resources: 2-924-779-BABY (1039) toll free    National Breastfeeding Help Line trained breastfeeding peer counselors can help answer common breast-feeding questions by phone. Monday-Friday: English/French  0-816- 571-5924 toll free, 1-134.884.2271 (TTY)    Care Connection: 313-353-CARE (3985)

## 2020-04-21 NOTE — PROGRESS NOTES
I have reviewed the history and physical examination. I was present for key portions of the visit and agree with the assessment and plan as documented above by Dr. Casey for 29 yr old  @ 35w 6d here for prenatal care. FHT/ FH appropriate.  Current issues include 1) excessive weight gain - counseled. GBS collected. /term labor precautions given.  Continue routine prenatal care.     Helio Suarez MD  2020  11:43 AM

## 2020-04-22 LAB
ALLERGIC TO PENICILLIN: NO
GP B STREP AG SPEC QL LA: POSITIVE

## 2020-04-23 PROBLEM — O26.03 EXCESSIVE WEIGHT GAIN DURING PREGNANCY IN THIRD TRIMESTER: Status: ACTIVE | Noted: 2020-01-23

## 2020-04-29 ENCOUNTER — VIRTUAL VISIT (OUTPATIENT)
Dept: FAMILY MEDICINE | Facility: CLINIC | Age: 30
End: 2020-04-29
Payer: COMMERCIAL

## 2020-04-29 VITALS — WEIGHT: 182 LBS | HEIGHT: 64 IN | BODY MASS INDEX: 31.07 KG/M2

## 2020-04-29 DIAGNOSIS — Z34.83 ENCOUNTER FOR SUPERVISION OF OTHER NORMAL PREGNANCY IN THIRD TRIMESTER: Primary | ICD-10-CM

## 2020-04-29 DIAGNOSIS — O26.03 EXCESSIVE WEIGHT GAIN DURING PREGNANCY IN THIRD TRIMESTER: ICD-10-CM

## 2020-04-29 RX ORDER — FERROUS SULFATE 325(65) MG
325 TABLET ORAL
Qty: 30 TABLET | Refills: 3 | Status: SHIPPED | OUTPATIENT
Start: 2020-04-29 | End: 2020-07-22

## 2020-04-29 ASSESSMENT — MIFFLIN-ST. JEOR: SCORE: 1530.55

## 2020-04-29 NOTE — PROGRESS NOTES
Preceptor Attestation:  Patient's case reviewed and discussed with  Patient seen and discussed with the resident..  I agree with written assessment and plan of care.  Supervising Physician:  Deanna Carpenter MD  [facility]

## 2020-04-29 NOTE — PROGRESS NOTES
"Family Medicine Telephone Visit Note           Telephone Visit Consent   Patient was verbally read the following and verbal consent was obtained.    \"This telephone visit will be conducted via a call between you and your physician/provider. We have found that certain health care needs can be provided without the need for a physical exam.  This service lets us provide the care you need with a short phone conversation.  If a prescription is necessary we can send it directly to your pharmacy.  If lab work is needed we can place an order for that and you can then stop by our lab to have the test done at a later time.    Telephone visits are billed at different rates depending on your insurance coverage. During this emergency period, for some insurers they may be billed the same as an in-person visit.  Please reach out to your insurance provider with any questions.    If during the course of the call the physician/provider feels a telephone visit is not appropriate, you will not be charged for this service.\"    Name person giving consent:  Patient   Date verbal consent given:  2020  Time verbal consent given:  11:00 AM      Chief Complaint   Patient presents with     Prenatal Care     Medication Reconciliation     Completed             HPI   Patients name: Vanna  Appointment start time:  11:06 AM    Vanna Min is a 29 year old  female who presents to clinic for a follow up OB visit. She is currently 37w0d with an estimated date of delivery of May 20, 2020 via [facility]. She denies headache, chest pain, shortness of breath, abdominal pain/contractions, vaginal bleeding, or abnormal discharge. She has felt baby move.     New concerns today: None    Excessive weight gain in pregnancy   - Exercise, has been walking regularly   - Has been working to eat more fruits and vegetables regularly  - Does not have a scale to home to check her weight     PUPPS    - Itching is worse at night.   - Uses hydrocortisone " "once daily which does help her symptoms.   - Feels it is generally well controlled with current regiment     Contractions for about 5 min the other day, but able to breathe through them    OB History    Para Term  AB Living   2 1 1 0 0 1   SAB TAB Ectopic Multiple Live Births   0 0 0 0 1      # Outcome Date GA Lbr Steven/2nd Weight Sex Delivery Anes PTL Lv   2 Current            1 Term 12/01/15 40w0d  2.92 kg (6 lb 7 oz) F Vag-Spont None N EVAN      Birth Comments: GBS +,  resuscitation: intubation, suctioning,PPV nasopharynx,mouth,nose. Suction below vocal cords- duration in 1 min      Name: Rita Braun      Apgar1: 5  Apgar5: 9       Current Outpatient Medications   Medication Sig Dispense Refill     hydrocortisone 2.5 % cream Apply topically nightly as needed 30 g 1     Prenatal Vit-Fe Fumarate-FA (PRENATAL MULTIVITAMIN W/IRON) 27-0.8 MG tablet Take 1 tablet by mouth daily 90 tablet 3     Allergies   Allergen Reactions     Sulfa Drugs             Review of Systems:     ROS COMP: Constitutional, HEENT, cardiovascular, pulmonary, gi and gu systems are negative, except as otherwise noted.         Physical Exam:     Ht 1.626 m (5' 4\")   Wt 82.6 kg (182 lb)   LMP 08/10/2019 (LMP Unknown)   BMI 31.24 kg/m    Estimated body mass index is 31.24 kg/m  as calculated from the following:    Height as of this encounter: 1.626 m (5' 4\").    Weight as of this encounter: 82.6 kg (182 lb).    Exam:  Constitutional: alert and no distress  Psychiatric: mentation appears normal and affect normal/bright        Assessment and Plan     1. Encounter for supervision of other normal pregnancy in third trimester  2. Excessive weight gain during pregnancy in third trimester  Continued to encourage healthy lifestyle with diet and exercise recommendations. Will continue monitoring her weight when seen at clinic next week.     Started daily iron supplementation due to low blood bank supply and colace daily to " prevent constipation     GBS POSITIVE  Expecting baby GIRL   Desires epidural for pain control   Plans to formula feed   Reviewed signs/symptoms of labor and when to present to the hospital.    Follow up in 1 week for routine prenatal visit in person, sooner if labor symptoms.     After Visit Information:  Patient chose to view AVS via [employer]    Appointment end time: 11:16 AM  This is a telephone visit that took 10 minutes.      Clinician location:  [facility]     Keren Davila, DO  I precepted today with Deanna Carpenter MD.

## 2020-05-07 ENCOUNTER — OFFICE VISIT (OUTPATIENT)
Dept: FAMILY MEDICINE | Facility: CLINIC | Age: 30
End: 2020-05-07
Payer: COMMERCIAL

## 2020-05-07 VITALS
RESPIRATION RATE: 16 BRPM | BODY MASS INDEX: 31.24 KG/M2 | OXYGEN SATURATION: 97 % | HEIGHT: 64 IN | DIASTOLIC BLOOD PRESSURE: 72 MMHG | WEIGHT: 183 LBS | HEART RATE: 88 BPM | SYSTOLIC BLOOD PRESSURE: 110 MMHG | TEMPERATURE: 98.1 F

## 2020-05-07 DIAGNOSIS — O36.60X0 EXCESSIVE FETAL GROWTH, ANTEPARTUM, SINGLE OR UNSPECIFIED FETUS: ICD-10-CM

## 2020-05-07 DIAGNOSIS — O26.03 EXCESSIVE WEIGHT GAIN DURING PREGNANCY IN THIRD TRIMESTER: ICD-10-CM

## 2020-05-07 DIAGNOSIS — Z34.83 ENCOUNTER FOR SUPERVISION OF OTHER NORMAL PREGNANCY IN THIRD TRIMESTER: Primary | ICD-10-CM

## 2020-05-07 RX ORDER — POLYETHYLENE GLYCOL 3350 17 G/17G
1 POWDER, FOR SOLUTION ORAL DAILY
Qty: 578 G | Refills: 1 | Status: SHIPPED | OUTPATIENT
Start: 2020-05-07 | End: 2020-07-22

## 2020-05-07 ASSESSMENT — MIFFLIN-ST. JEOR: SCORE: 1534.7

## 2020-05-07 NOTE — PROGRESS NOTES
"Vanna Min is a 29 year old  female who presents to clinic for a follow up OB visit. She is currently 38w0d with an estimated date of delivery of May 20, 2020 via 10w US. She denies headache, chest pain, shortness of breath, abdominal pain/contractions, vaginal bleeding, or abnormal discharge. She has felt baby move.     New concerns today: None    Contractions 3-4 yesterday. Lasted 30 sec- 1 minute and then stopped     Excessive weight gain in pregnancy   - Has only gained about 1lb since last in person visit   - Continuing to walk and make good dietary choices     PUPPS   - Doing well and hast used steroid in over a week     Picked up her iron supplement, but didn't get the colace (not covered by insurance) so didn't start taking her iron. Has worked on increasing the amount of iron in her diet     OB History    Para Term  AB Living   2 1 1 0 0 1   SAB TAB Ectopic Multiple Live Births   0 0 0 0 1      # Outcome Date GA Lbr Steven/2nd Weight Sex Delivery Anes PTL Lv   2 Current            1 Term 12/01/15 40w0d  2.92 kg (6 lb 7 oz) F Vag-Spont None N EVAN      Birth Comments: GBS +,  resuscitation: intubation, suctioning,PPV nasopharynx,mouth,nose. Suction below vocal cords- duration in 1 min      Name: Rita Min-Joe      Apgar1: 5  Apgar5: 9       Physical exam:  /72   Pulse 88   Temp 98.1  F (36.7  C) (Oral)   Resp 16   Ht 1.625 m (5' 3.98\")   Wt 83 kg (183 lb)   LMP 08/10/2019 (LMP Unknown)   SpO2 97%   BMI 31.44 kg/m      General: alert, female in no acute distress  Abdomen: gravid uterus appropriate for gestation age at 42 cm above pubic symphysis, non-tender, FHTs 136, Leopold's maneuver reveals cephalic positioning  Gyn: 0cm/90%/-4, no discharge  Extremities: no edema    Assessment/Plan:  Patient Active Problem List   Diagnosis     Screening for cervical cancer     Encounter for supervision of other normal pregnancy in third trimester     Excessive " weight gain during pregnancy in third trimester       1. Encounter for supervision of other normal pregnancy in third trimester    2. Fundal heights greater than dates   Fundal height 4cm greater than dates. Likely secondary to excessive weight gain, however given discrepancy, will get growth US today   - Growth US     3. Excessive weight gain during pregnancy in third trimester  Continued to encourage healthy lifestyle with diet and exercise recommendations.      GBS POSITIVE  Expecting baby GIRL   Desires epidural for pain control   Plans to formula feed   Start iron and Miralax (due to low blood bank supply)  Contraception: OCPs   Reviewed signs/symptoms of labor and when to present to the hospital.     Follow up in 1 week for routine prenatal visit in person, sooner if labor symptoms.     Keren Davila DO (PGY3)  [facility] Resident  Pager: [phone]      Precepted with: Helio Suarez MD

## 2020-05-07 NOTE — NURSING NOTE
"Chief Complaint   Patient presents with     Prenatal Care     38 weeks, no other concerns.      Medication Reconciliation     completed       /72   Pulse 88   Temp 98.1  F (36.7  C) (Oral)   Resp 16   Ht 1.625 m (5' 3.98\")   Wt 83 kg (183 lb)   LMP 08/10/2019 (LMP Unknown)   SpO2 97%   BMI 31.44 kg/m        ~ Enrique Dixon CMA (Chrissi)  [facility]  Phone: [phone]    "

## 2020-05-07 NOTE — PROGRESS NOTES
I have reviewed the history and physical examination. I was present for key portions of the visit and agree with the assessment and plan as documented above by Dr. Davila for 29 yr old  @ 38w 1d here for prenatal care. FHT appropriate; Measuring S>D. Current issues include 1) excessive weight gain 2) S>D -> growth scan today.  Term labor precautions given.  Continue routine prenatal care.     Helio Suarez MD  May 7, 2020  2:42 PM

## 2020-05-11 NOTE — PROGRESS NOTES
"Vanna Min is a 29 year old  female who presents to clinic for a follow up OB visit. She is currently 38w5d with an estimated date of delivery of May 20, 2020 via 10w US. She denies headache, chest pain, shortness of breath, abdominal pain/contractions, vaginal bleeding, or abnormal discharge. She has felt baby move.     New concerns today: No new concerns today. Ready to be done being pregnant.     Excessive weight gain in pregnancy -- size > dates noted at last visit on  with fundal height measuring 4 cm greater than dates. Growth US was obtained which showed that fetal growth has been consistent with dates, EFW 3482g (7.67 lb).    PUPPS -- was previously using steroid cream, hasn't needed to use over the past few weeks    Physical exam:  /66   Pulse 90   Temp 97.9  F (36.6  C) (Oral)   Resp 20   Ht 1.634 m (5' 4.35\")   Wt 83.8 kg (184 lb 12.8 oz)   LMP 08/10/2019 (LMP Unknown)   SpO2 98%   BMI 31.38 kg/m      General: alert, female in no acute distress  Abdomen: gravid uterus appropriate for gestation age at 40 cm above pubic symphysis, non-tender, FHTs 139, Leopold's maneuver reveals cephalic positioning  Extremities: no edema    Assessment/Plan:  Patient Active Problem List   Diagnosis     Screening for cervical cancer     Encounter for supervision of other normal pregnancy in third trimester     Excessive weight gain during pregnancy in third trimester     GBS POSITIVE  Expecting baby GIRL   Desires epidural for pain control   Plans to formula feed   Continue  iron and Miralax (due to low blood bank supply)  Contraception: OCPs     Reviewed signs/symptoms of labor and when to present to the hospital.   Follow up in 1 week for routine prenatal visit, sooner if labor symptoms.     Continued to encourage healthy lifestyle with diet and exercise recommendations given excess weight gain in pregnancy.     Arline Casey MD  [facility] Family Medicine Resident  P: " [phone]    Precepted with: Serena Romero MD

## 2020-05-12 ENCOUNTER — OFFICE VISIT (OUTPATIENT)
Dept: FAMILY MEDICINE | Facility: CLINIC | Age: 30
End: 2020-05-12
Payer: COMMERCIAL

## 2020-05-12 VITALS
BODY MASS INDEX: 31.55 KG/M2 | RESPIRATION RATE: 20 BRPM | WEIGHT: 184.8 LBS | HEIGHT: 64 IN | HEART RATE: 90 BPM | SYSTOLIC BLOOD PRESSURE: 101 MMHG | TEMPERATURE: 97.9 F | DIASTOLIC BLOOD PRESSURE: 66 MMHG | OXYGEN SATURATION: 98 %

## 2020-05-12 DIAGNOSIS — Z34.83 ENCOUNTER FOR SUPERVISION OF OTHER NORMAL PREGNANCY IN THIRD TRIMESTER: Primary | ICD-10-CM

## 2020-05-12 ASSESSMENT — MIFFLIN-ST. JEOR: SCORE: 1548.81

## 2020-05-12 NOTE — PROGRESS NOTES
Preceptor Attestation:   Patient seen, evaluated and discussed with the resident. I have verified the content of the note, which accurately reflects my assessment of the patient and the plan of care.  Supervising Physician:Serena Romero MD  [facility]

## 2020-05-12 NOTE — PATIENT INSTRUCTIONS
[facility] Information  If you have any further concerns or wish to schedule another appointment, please call our office at [phone] during normal business hours (8-5, M-F).     For uncomplicated pregnancies, you will be seeing your doctor once a month until you are 28 weeks, then you will see your doctor twice a month. You will begin weekly visits at 36 weeks until you deliver.     If you have urgent medical questions that cannot wait, you may call [phone] at any time of day.     If you have a medical emergency, please call [phone].    When to call or come in to the hospital    If you notice a decrease in your baby's movement.     If your begin to experience contractions that are 5 minutes or less apart and lasting for over 45 seconds, or if contractions are becoming more painful.    If you have any bleeding or leakage of fluids.     If you have a headache not resolved with tylenol, right upper abdominal pain, or sudden onset of swelling.    You know your body best. Never hesitate to call or go to the hospital if something doesn't feel right!    [facility]   Address: [address] [place] [address]. [place] [address]   Phone: [phone]    Preparing for the hospital  You re likely feeling anxious as your child s birth approaches. This is normal. To give yourself some peace of mind, pack a bag 3-4 weeks before your due date. Here is a list of things to remember:    Personal care items like toothbrush, hair brush, lip balm, lotion, shampoo, glasses, contacts    Nightgown, bathrobe, slippers    Several pairs of underwear    Comfortable clothes for you to wear home    Camera with new batteries    Cell phone and     Insurance information and any other paperwork needed for your hospital stay    Clothes for baby to wear home    An infant, rear-facing car seat for bringing home your baby (this is required by law)

## 2020-05-12 NOTE — NURSING NOTE
"Chief Complaint   Patient presents with     Prenatal Care     38w 6d     Medication Reconciliation     completed.        /66   Pulse 90   Temp 97.9  F (36.6  C) (Oral)   Resp 20   Ht 1.634 m (5' 4.35\")   Wt 83.8 kg (184 lb 12.8 oz)   LMP 08/10/2019 (LMP Unknown)   SpO2 98%   BMI 31.38 kg/m        ~ Enrique Dixon CMA (Chrissi)  [facility]  Phone: [phone]    "

## 2020-05-20 ENCOUNTER — OFFICE VISIT - HEALTHEAST (OUTPATIENT)
Dept: FAMILY MEDICINE | Facility: CLINIC | Age: 30
End: 2020-05-20

## 2020-05-20 ENCOUNTER — OFFICE VISIT (OUTPATIENT)
Dept: FAMILY MEDICINE | Facility: CLINIC | Age: 30
End: 2020-05-20
Payer: COMMERCIAL

## 2020-05-20 ENCOUNTER — AMBULATORY - HEALTHEAST (OUTPATIENT)
Dept: FAMILY MEDICINE | Facility: CLINIC | Age: 30
End: 2020-05-20

## 2020-05-20 ENCOUNTER — TELEPHONE (OUTPATIENT)
Dept: FAMILY MEDICINE | Facility: CLINIC | Age: 30
End: 2020-05-20

## 2020-05-20 ENCOUNTER — TRANSFERRED RECORDS (OUTPATIENT)
Dept: HEALTH INFORMATION MANAGEMENT | Facility: CLINIC | Age: 30
End: 2020-05-20

## 2020-05-20 VITALS
OXYGEN SATURATION: 94 % | DIASTOLIC BLOOD PRESSURE: 69 MMHG | WEIGHT: 187 LBS | SYSTOLIC BLOOD PRESSURE: 106 MMHG | RESPIRATION RATE: 20 BRPM | BODY MASS INDEX: 31.75 KG/M2 | HEART RATE: 90 BPM | TEMPERATURE: 98.2 F

## 2020-05-20 DIAGNOSIS — Z34.83 ENCOUNTER FOR SUPERVISION OF OTHER NORMAL PREGNANCY IN THIRD TRIMESTER: ICD-10-CM

## 2020-05-20 DIAGNOSIS — O26.03 EXCESSIVE WEIGHT GAIN DURING PREGNANCY IN THIRD TRIMESTER: ICD-10-CM

## 2020-05-20 DIAGNOSIS — Z34.83 ENCOUNTER FOR SUPERVISION OF OTHER NORMAL PREGNANCY IN THIRD TRIMESTER: Primary | ICD-10-CM

## 2020-05-20 LAB
SARS-COV-2 PCR COMMENT: NORMAL
SARS-COV-2 RNA SPEC QL NAA+PROBE: NEGATIVE
SARS-COV-2 VIRUS SPECIMEN SOURCE: NORMAL

## 2020-05-20 NOTE — PROGRESS NOTES
Preceptor Attestation:  Patient's case reviewed and discussed with Arline Casey MD resident and I evaluated the patient. I agree with written assessment and plan of care.  Supervising Physician:  Ryder Boateng MD, MD [facility]  [facility]

## 2020-05-20 NOTE — PATIENT INSTRUCTIONS
I will call you this afternoon to let you know the induction plan.      [facility] Information  If you have any further concerns or wish to schedule another appointment, please call our office at [phone] during normal business hours (8-5, M-F).     For uncomplicated pregnancies, you will be seeing your doctor once a month until you are 28 weeks, then you will see your doctor twice a month. You will begin weekly visits at 36 weeks until you deliver.     If you have urgent medical questions that cannot wait, you may call [phone] at any time of day.     If you have a medical emergency, please call [phone].    When to call or come in to the hospital    If you notice a decrease in your baby's movement.     If your begin to experience contractions that are 5 minutes or less apart and lasting for over 45 seconds, or if contractions are becoming more painful.    If you have any bleeding or leakage of fluids.     If you have a headache not resolved with tylenol, right upper abdominal pain, or sudden onset of swelling.    You know your body best. Never hesitate to call or go to the hospital if something doesn't feel right!    [facility]   Address: [address] [place] [address]. [place] [address]   Phone: [phone]    Preparing for the hospital  You re likely feeling anxious as your child s birth approaches. This is normal. To give yourself some peace of mind, pack a bag 3-4 weeks before your due date. Here is a list of things to remember:    Personal care items like toothbrush, hair brush, lip balm, lotion, shampoo, glasses, contacts    Nightgown, bathrobe, slippers    Several pairs of underwear    Comfortable clothes for you to wear home    Camera with new batteries    Cell phone and     Insurance information and any other paperwork needed for your hospital stay    Clothes for baby to wear home    An infant, rear-facing car seat for bringing home your baby (this is required by law)

## 2020-05-20 NOTE — TELEPHONE ENCOUNTER
[facility] Family Medicine phone call message- general phone call:    Reason for call: Patient was sent for a Covid19 testing today. Patient would like to know when she would get the test back. Patient is also questioning the appointment for tomorrow as Dr. Lancaster inform patient to call labor and delivery by 2PM at [facility] to schedule her in for an cervical ripping. Patient would like to know what it is.    Return call needed: Yes    OK to leave a message on voice mail? Yes    Primary language: English      needed? No    Call taken on May 20, 2020 at 3:57 PM by Marcelo Dixon

## 2020-05-20 NOTE — PROGRESS NOTES
Vanna Min is a 29 year old  female who presents to clinic for a follow up OB visit. She is currently 40w0d with an estimated date of delivery of May 20, 2020 via 10 w US. She denies headache, chest pain, shortness of breath, abdominal pain/contractions, vaginal bleeding, or abnormal discharge. She has felt baby move.     New concerns today: Has some small, short contractions yesterday.     Excessive weight gain in pregnancy -- size > dates noted at visit on  with fundal height measuring 4 cm greater than dates. Growth US was obtained which showed that fetal growth has been consistent with dates, EFW 3482g (7.67 lb).     OB History    Para Term  AB Living   2 1 1 0 0 1   SAB TAB Ectopic Multiple Live Births   0 0 0 0 1      # Outcome Date GA Lbr Steven/2nd Weight Sex Delivery Anes PTL Lv   2 Current            1 Term 12/01/15 40w0d  2.92 kg (6 lb 7 oz) F Vag-Spont None N EVAN      Birth Comments: GBS +,  resuscitation: intubation, suctioning,PPV nasopharynx,mouth,nose. Suction below vocal cords- duration in 1 min      Name: Rita Min-Joe      Apgar1: 5  Apgar5: 9        Physical exam:  /69   Pulse 90   Temp 98.2  F (36.8  C) (Oral)   Resp 20   Wt 84.8 kg (187 lb)   LMP 08/10/2019 (LMP Unknown)   SpO2 94%   BMI 31.75 kg/m      General: alert, female in no acute distress  Abdomen: gravid uterus appropriate for gestation age at 40 cm above pubic symphysis, non-tender, FHTs 137, Leopold's maneuver reveals cephalic positioning  Gyn: 1cm/25%/-2, posterior, moderately firm (Viera 3) no discharge  Extremities: no edema    Assessment/Plan:  Patient Active Problem List   Diagnosis     Screening for cervical cancer     Encounter for supervision of other normal pregnancy in third trimester     Excessive weight gain during pregnancy in third trimester     GBS POSITIVE  Expecting baby GIRL   Desires epidural for pain control   Plans to formula feed   Continue  iron and  Miralax (due to low blood bank supply)  Contraception: OCPs     Discussed plan for induction starting with cervical ripening 5/21 PM due to low Viera's score (3). Will order COVID-19 testing for asymptomatic patient per L&D guidelines.      Arline Casey MD  [facility] Medicine Resident  P: [phone]    Precepted with: Ryder Boateng MD

## 2020-05-21 ENCOUNTER — COMMUNICATION - HEALTHEAST (OUTPATIENT)
Dept: FAMILY MEDICINE | Facility: CLINIC | Age: 30
End: 2020-05-21

## 2020-05-22 ENCOUNTER — ANESTHESIA - HEALTHEAST (OUTPATIENT)
Dept: OBGYN | Facility: HOSPITAL | Age: 30
End: 2020-05-22

## 2020-05-27 ENCOUNTER — DOCUMENTATION ONLY (OUTPATIENT)
Dept: FAMILY MEDICINE | Facility: CLINIC | Age: 30
End: 2020-05-27

## 2020-05-27 NOTE — PROGRESS NOTES
Vanna successfully delivered a term  at 40w2d GA at [facility] via . Vanna had a planned induction and complications included GBS positive. [place] weight 7lb 7.6oz, apgars 8 and 9 at one and five minutes, no lacerations noted, placenta delivered spontaneously, EBL 333mL, mother plans to formula feed infant. Pregnancy episode resolved in EMR. Rudolph GENTILE

## 2020-07-22 ENCOUNTER — OFFICE VISIT (OUTPATIENT)
Dept: FAMILY MEDICINE | Facility: CLINIC | Age: 30
End: 2020-07-22
Payer: COMMERCIAL

## 2020-07-22 VITALS
SYSTOLIC BLOOD PRESSURE: 121 MMHG | DIASTOLIC BLOOD PRESSURE: 81 MMHG | RESPIRATION RATE: 16 BRPM | WEIGHT: 161 LBS | HEART RATE: 80 BPM | HEIGHT: 66 IN | TEMPERATURE: 98.1 F | BODY MASS INDEX: 25.88 KG/M2 | OXYGEN SATURATION: 98 %

## 2020-07-22 DIAGNOSIS — Z30.41 ENCOUNTER FOR SURVEILLANCE OF CONTRACEPTIVE PILLS: ICD-10-CM

## 2020-07-22 PROBLEM — O26.03 EXCESSIVE WEIGHT GAIN DURING PREGNANCY IN THIRD TRIMESTER: Status: RESOLVED | Noted: 2020-01-23 | Resolved: 2020-07-22

## 2020-07-22 RX ORDER — NORGESTIMATE AND ETHINYL ESTRADIOL 7DAYSX3 LO
1 KIT ORAL DAILY
Qty: 84 TABLET | Refills: 3 | Status: SHIPPED | OUTPATIENT
Start: 2020-07-22 | End: 2022-01-04

## 2020-07-22 ASSESSMENT — MIFFLIN-ST. JEOR: SCORE: 1469.29

## 2020-07-22 NOTE — NURSING NOTE
"Chief Complaint   Patient presents with     Postpartum Care     6 weeks pp check up. Would like postcard filled out for giftcard     Medication Reconciliation     completed. Need to add contraceptive meds to list. May have been from the hospital .        /81   Pulse 80   Temp 98.1  F (36.7  C) (Oral)   Resp 16   Ht 1.68 m (5' 6.14\")   Wt 73 kg (161 lb)   SpO2 98%   BMI 25.87 kg/m        ~ Enrique Dixon CMA (Chrissi)  [facility]  Phone: [phone]    "

## 2020-07-22 NOTE — PROGRESS NOTES
"HPI  Vanna Min is a 29 year old  female presenting for routine postpartum follow up.  on 2020 at 40w2d. Complications noted during the pregnancy include excessive weight gain in pregnancy.  No complications at the time of delivery.    Mood: denies concerns with postpartum blues/depression  Energy: good, no fatigue  Contraception: OCPs (formula feeding)  Has resumed sexual activity, no concerns   Had return of her period within the past 3-4 weeks. No worrisome bleeding.   Has lost 26 lbs since delivery.    No other concerns today.    ROS:  General: No fevers.  Cardiac: No chest pain or palpitations.  Breasts: No redness, warmth, pain.   Pulmonary: No shortness of breath or respiratory distress.  GI: No abdominal pain.  Normal bowel habits, no incontinence.  : No dysuria, hematuria, no incontinence.  Extremities: No edema.    Allergies   Allergen Reactions     Sulfa Drugs      Current Outpatient Medications   Medication Sig Dispense Refill     norgestim-eth estrad triphasic (ORTHO TRI-CYCLEN LO) 0.18/0.215/0.25 MG-25 MCG tablet Take 1 tablet by mouth daily 84 tablet 3     Past Medical History:   Diagnosis Date     NO ACTIVE PROBLEMS      Varicella     hx of disease      OB History    Para Term  AB Living   2 2 2 0 0 2   SAB TAB Ectopic Multiple Live Births   0 0 0 0 2      # Outcome Date GA Lbr Steven/2nd Weight Sex Delivery Anes PTL Lv   2 Term 20 40w2d  3.391 kg (7 lb 7.6 oz) F  EPI N EVAN      Complications: GBS      Name: Thania Goodson      Apgar1: 8  Apgar5: 9   1 Term 12/01/15 40w0d  2.92 kg (6 lb 7 oz) F Vag-Spont None N EVAN      Birth Comments: GBS +,  resuscitation: intubation, suctioning,PPV nasopharynx,mouth,nose. Suction below vocal cords- duration in 1 min      Name: Rita Braun      Apgar1: 5  Apgar5: 9     Exam  /81   Pulse 80   Temp 98.1  F (36.7  C) (Oral)   Resp 16   Ht 1.68 m (5' 6.14\")   Wt 73 kg (161 lb)   LMP " 07/17/2020   SpO2 98%   BMI 25.87 kg/m     General: no apparent distress, appears well  Cardiovascular: regular rate and rhythm without murmur  Pulmonary: clear to auscultation bilaterally without wheezing or crackles  Abdomen: Soft, non-tender.   Lower extremity: no significant swelling    Assessment/Plan  1. Routine postpartum follow-up  Overall doing well and adjusting to new baby. Post partum depression has not been a concern. Ok to resume normal activities without restriction. Discussed goal to return to normal BMI over next 6-12 months and goal to return to pre-pregnancy weight in the next 2 years.    2. Encounter for surveillance of contraceptive pills  Refill sent.  - norgestim-eth estrad triphasic (ORTHO TRI-CYCLEN LO) 0.18/0.215/0.25 MG-25 MCG tablet; Take 1 tablet by mouth daily  Dispense: 84 tablet; Refill: 3      Arline Casey MD  [facility] Family Medicine Resident    Precepted with: Buddy Leblanc MD

## 2020-11-22 ENCOUNTER — HEALTH MAINTENANCE LETTER (OUTPATIENT)
Age: 30
End: 2020-11-22

## 2021-04-04 ENCOUNTER — HEALTH MAINTENANCE LETTER (OUTPATIENT)
Age: 31
End: 2021-04-04

## 2021-06-08 NOTE — ANESTHESIA PREPROCEDURE EVALUATION
Anesthesia Evaluation      Patient summary reviewed   No history of anesthetic complications     Airway   Mallampati: II   Pulmonary - negative ROS and normal exam                          Cardiovascular - negative ROS and normal exam  Rhythm: regular  Rate: normal,         Neuro/Psych - negative ROS     Endo/Other    (+) obesity, pregnant     GI/Hepatic/Renal - negative ROS      Other findings: Results for KAMARI HUDDLESTON (MRN [id number]) as of 5/22/2020 09:27    5/20/2020 14:44  SARS-CoV-2 PCR Result: NEGATIVE        Dental - normal exam                        Anesthesia Plan  Planned anesthetic: epidural    ASA 2     Anesthetic plan and risks discussed with: patient    Post-op plan: routine recovery

## 2021-06-08 NOTE — ANESTHESIA POSTPROCEDURE EVALUATION
"Patient: Vanna Min  * No procedures listed *  Anesthesia type: epidural    Patient location: Labor and Delivery  Last vitals: No vitals data found for the desired time range.    Post vital signs: stable  Level of consciousness: awake and responds to simple questions  Post-anesthesia pain: pain controlled  Post-anesthesia nausea and vomiting: no  Pulmonary: unassisted, return to baseline  Cardiovascular: stable and blood pressure at baseline  Hydration: adequate  Anesthetic events: no    QCDR Measures:  ASA# 11 - Kamala-op Cardiac Arrest: ASA11B - Patient did NOT experience unanticipated cardiac arrest  ASA# 12 - Kamala-op Mortality Rate: ASA12B - Patient did NOT die  ASA# 13 - PACU Re-Intubation Rate: NA - No ETT / LMA used for case  ASA# 10 - Composite Anes Safety: ASA10A - No serious adverse event    Additional Notes:  Vanna Min status post labor epidural for delivery. Satisfied with anesthetic care. /69 (Patient Position: Semi-su, Cuff Size: Adult Regular)   Pulse 78   Temp 36.8  C (98.3  F) (Oral)   Resp 16   Ht 5' 4.5\" (1.638 m)   Wt 187 lb (84.8 kg)   SpO2 98%   Breastfeeding Unknown   BMI 31.60 kg/m    Denies headache. Neurologically intact. No apparent anesthetic complications. No follow up indicated.         "

## 2021-06-08 NOTE — ANESTHESIA PROCEDURE NOTES
Epidural Block    Patient location during procedure: OB  Time Called: 5/22/2020 9:38 AM  Reason for Block:labor epidural  Staffing:  Performing  Anesthesiologist: Diogo Stearns MD  Preanesthetic Checklist  Completed: patient identified, risks, benefits, and alternatives discussed, timeout performed, consent obtained, at patient's request, airway assessed, oxygen available, suction available, emergency drugs available and hand hygiene performed  Procedure  Patient position: sitting  Prep: ChloraPrep  Patient monitoring: continuous pulse oximetry  Approach: midline  Location: L3-L4  Injection technique: SAMUEL saline  Number of Attempts:1  Needle  Needle type: Rony   Needle gauge: 18 G     Catheter in Space: 5  Assessment  Sensory level: T8  No complications      Additional Notes:  Called to patient's room for epidural  Consent obtained  Timeput performed  RN at bedside during procedure

## 2021-06-20 NOTE — LETTER
Letter by Vanessa Del Cid RN at      Author: Vanessa Del Cid RN Service: -- Author Type: --    Filed:  Encounter Date: 5/21/2020 Status: (Other)       5/21/2020        Vanna Min  [address]  [place] [address]    This letter provides a written record that you were tested for COVID-19 on 5/20/20.     Your result was negative.    This means that we didnt find the virus that causes COVID-19 in your sample. A test may show negative when you do actually have the virus. This can happen when the virus is in the early stages of infection, before you feel illness symptoms.    Even if you dont have symptoms, they may still appear. For safety, its very important to follow these rules.    Keep yourself away from others (self-isolation):      Stay home. Dont go to work, school or anywhere else.     Stay in your own room (and use your own bathroom), if you can.    Stay away from others in your home. No hugging, kissing or shaking hands. No visitors.    Clean high touch surfaces often (doorknobs, counters, handles, etc.). Use a household cleaning spray or wipes.    Cover your mouth and nose with a mask, tissue or washcloth to avoid spreading germs.    Wash your hands and face often with soap and water.    Stay in self-isolation until you meet ALL of the guidelines below:    1. You have had no fever for at least 72 hours (that is 3 full days of no fever without the use of medicine that reduces fevers), AND  2. other symptoms (such as cough, shortness of breath) have gotten better, AND  3. at least 10 days have passed since your symptoms first appeared.    Going back to work  Check with your employer for any guidelines to follow for going back to work.    Employers: This document serves as formal notice that your employee tested negative for COVID-19, as of the testing date shown above.    For questions regarding this letter or your Negative COVID-19 result, call [phone] between 8A to 6:30P  (M-F) and 10A to 6:30P (weekends).

## 2021-09-19 ENCOUNTER — HEALTH MAINTENANCE LETTER (OUTPATIENT)
Age: 31
End: 2021-09-19

## 2022-01-04 DIAGNOSIS — Z30.41 ENCOUNTER FOR SURVEILLANCE OF CONTRACEPTIVE PILLS: ICD-10-CM

## 2022-01-04 RX ORDER — NORGESTIMATE AND ETHINYL ESTRADIOL 7DAYSX3 LO
1 KIT ORAL DAILY
Qty: 84 TABLET | Refills: 0 | Status: SHIPPED | OUTPATIENT
Start: 2022-01-04 | End: 2024-01-29

## 2022-01-04 NOTE — PROGRESS NOTES
3 month supply of oral contraceptive pills refilled. Patient to follow up within this interval.     Jenelle Bryant MD

## 2022-02-17 PROBLEM — Z34.83 ENCOUNTER FOR SUPERVISION OF OTHER NORMAL PREGNANCY IN THIRD TRIMESTER: Status: RESOLVED | Noted: 2019-10-16 | Resolved: 2020-07-22

## 2022-05-01 ENCOUNTER — HEALTH MAINTENANCE LETTER (OUTPATIENT)
Age: 32
End: 2022-05-01

## 2022-11-21 ENCOUNTER — HEALTH MAINTENANCE LETTER (OUTPATIENT)
Age: 32
End: 2022-11-21

## 2023-06-02 ENCOUNTER — HEALTH MAINTENANCE LETTER (OUTPATIENT)
Age: 33
End: 2023-06-02

## 2024-01-29 ENCOUNTER — OFFICE VISIT (OUTPATIENT)
Dept: FAMILY MEDICINE | Facility: CLINIC | Age: 34
End: 2024-01-29
Payer: COMMERCIAL

## 2024-01-29 VITALS
RESPIRATION RATE: 20 BRPM | OXYGEN SATURATION: 97 % | HEIGHT: 66 IN | HEART RATE: 71 BPM | TEMPERATURE: 98.6 F | BODY MASS INDEX: 24.27 KG/M2 | WEIGHT: 151 LBS | SYSTOLIC BLOOD PRESSURE: 103 MMHG | DIASTOLIC BLOOD PRESSURE: 59 MMHG

## 2024-01-29 DIAGNOSIS — R01.1 SYSTOLIC MURMUR: ICD-10-CM

## 2024-01-29 DIAGNOSIS — Z30.41 ENCOUNTER FOR SURVEILLANCE OF CONTRACEPTIVE PILLS: ICD-10-CM

## 2024-01-29 DIAGNOSIS — Z11.3 SCREEN FOR STD (SEXUALLY TRANSMITTED DISEASE): Primary | ICD-10-CM

## 2024-01-29 DIAGNOSIS — Z80.3 FAMILY HISTORY OF MALIGNANT NEOPLASM OF BREAST: ICD-10-CM

## 2024-01-29 DIAGNOSIS — Z00.00 ROUTINE GENERAL MEDICAL EXAMINATION AT A HEALTH CARE FACILITY: ICD-10-CM

## 2024-01-29 LAB
CLUE CELLS: ABNORMAL
HCV AB SERPL QL IA: NONREACTIVE
TRICHOMONAS, WET PREP: ABNORMAL
WBC'S/HIGH POWER FIELD, WET PREP: ABNORMAL
YEAST, WET PREP: ABNORMAL

## 2024-01-29 PROCEDURE — 87491 CHLMYD TRACH DNA AMP PROBE: CPT

## 2024-01-29 PROCEDURE — 87389 HIV-1 AG W/HIV-1&-2 AB AG IA: CPT

## 2024-01-29 PROCEDURE — 86803 HEPATITIS C AB TEST: CPT

## 2024-01-29 PROCEDURE — 86780 TREPONEMA PALLIDUM: CPT

## 2024-01-29 PROCEDURE — 80061 LIPID PANEL: CPT

## 2024-01-29 PROCEDURE — 99213 OFFICE O/P EST LOW 20 MIN: CPT | Mod: 25

## 2024-01-29 PROCEDURE — 36415 COLL VENOUS BLD VENIPUNCTURE: CPT

## 2024-01-29 PROCEDURE — 87591 N.GONORRHOEAE DNA AMP PROB: CPT

## 2024-01-29 PROCEDURE — 99385 PREV VISIT NEW AGE 18-39: CPT | Mod: GC

## 2024-01-29 PROCEDURE — 87210 SMEAR WET MOUNT SALINE/INK: CPT

## 2024-01-29 RX ORDER — NORGESTIMATE AND ETHINYL ESTRADIOL 7DAYSX3 LO
1 KIT ORAL DAILY
Qty: 84 TABLET | Refills: 0 | Status: SHIPPED | OUTPATIENT
Start: 2024-01-29 | End: 2024-01-29

## 2024-01-29 RX ORDER — NORGESTIMATE AND ETHINYL ESTRADIOL 7DAYSX3 LO
1 KIT ORAL DAILY
Qty: 84 TABLET | Refills: 3 | Status: SHIPPED | OUTPATIENT
Start: 2024-01-29

## 2024-01-29 ASSESSMENT — ENCOUNTER SYMPTOMS
HEMATOCHEZIA: 0
CONSTIPATION: 0
EYE PAIN: 0
NAUSEA: 0
ABDOMINAL PAIN: 0
COUGH: 0
SORE THROAT: 0
HEMATURIA: 0
WEAKNESS: 0
DYSURIA: 0
FREQUENCY: 0
SHORTNESS OF BREATH: 0
HEARTBURN: 0
JOINT SWELLING: 0
DIZZINESS: 0
HEADACHES: 0
NERVOUS/ANXIOUS: 0
PALPITATIONS: 0
CHILLS: 0
FEVER: 0
DIARRHEA: 0
PARESTHESIAS: 0
ARTHRALGIAS: 0
BREAST MASS: 0
MYALGIAS: 0

## 2024-01-29 NOTE — PROGRESS NOTES
Preventive Care Visit  [facility]  Eleni Rodriguez MD, Student in organized health care education/training program  Jan 29, 2024       SUBJECTIVE:   Vanna is a 33 year old, presenting for the following:  Physical        1/29/2024     2:16 PM   Additional Questions   Roomed by shraddha barnett   Accompanied by daughter     Healthy Habits:     Getting at least 3 servings of Calcium per day:  Yes    Bi-annual eye exam:  Yes    Dental care twice a year:  Yes    Sleep apnea or symptoms of sleep apnea:  None    Diet:  Regular (no restrictions)    Frequency of exercise:  4-5 days/week    Duration of exercise:  45-60 minutes    Taking medications regularly:  Yes    Medication side effects:  None    Additional concerns today:  No      Today's PHQ-2 Score:       1/29/2024     2:14 PM   PHQ-2 ( 1999 [employer])   Q1: Little interest or pleasure in doing things 0   Q2: Feeling down, depressed or hopeless 0   PHQ-2 Score 0   Q1: Little interest or pleasure in doing things Not at all   Q2: Feeling down, depressed or hopeless Not at all   PHQ-2 Score 0     Have you ever done Advance Care Planning? (For example, a Health Directive, POLST, or a discussion with a medical provider or your loved ones about your wishes): No, advance care planning information given to patient to review.  Patient plans to discuss their wishes with loved ones or provider.      Social History     Tobacco Use    Smoking status: Former     Passive exposure: Past    Smokeless tobacco: Former     Quit date: 12/2/2013   Substance Use Topics    Alcohol use: No     Alcohol/week: 0.0 standard drinks of alcohol             1/29/2024     2:14 PM   Alcohol Use   Prescreen: >3 drinks/day or >7 drinks/week? No     Reviewed orders with patient.  Reviewed health maintenance and updated orders accordingly - Yes      Breast Cancer Screening: Mother with history of breast cancer at 29. She was screened and was BRCA negative. Per  no need to  "screen before 40 years old.     Pertinent mammograms are reviewed under the imaging tab.    History of abnormal Pap smear: NO - age 30-65 PAP every 5 years with negative HPV co-testing recommended      Latest Ref Rng & Units 12/19/2019     3:04 PM 4/22/2015     3:36 PM   PAP / HPV   PAP   Negative for squamous intraepithelial lesion or malignancy  Electronically signed by Maia Lockett CT (ASCP) on 4/28/2015 at 12:17 PM      HPV 16 DNA NEG Negative     HPV 18 DNA NEG Negative     Other HR HPV NEG Negative       Reviewed and updated as needed this visit by clinical staff   Tobacco  Allergies  Meds  Problems  Med Hx  Surg Hx  Fam Hx          Reviewed and updated as needed this visit by Provider   Tobacco  Allergies  Meds  Problems  Med Hx  Surg Hx  Fam Hx            Review of Systems   Constitutional:  Negative for chills and fever.   HENT:  Negative for congestion, ear pain, hearing loss and sore throat.    Eyes:  Negative for pain and visual disturbance.   Respiratory:  Negative for cough and shortness of breath.    Cardiovascular:  Negative for chest pain and palpitations.   Gastrointestinal:  Negative for abdominal pain, constipation, diarrhea and nausea.   Genitourinary:  Negative for dysuria, frequency, genital sores, hematuria, pelvic pain, urgency, vaginal bleeding and vaginal discharge.   Musculoskeletal:  Negative for arthralgias, joint swelling and myalgias.   Skin:  Negative for rash.   Neurological:  Negative for dizziness, weakness and headaches.   Psychiatric/Behavioral:  The patient is not nervous/anxious.      OBJECTIVE:   /59 (BP Location: Right arm, Patient Position: Sitting, Cuff Size: Adult Regular)   Pulse 71   Temp 98.6  F (37  C)   Resp 20   Ht 1.68 m (5' 6.14\")   Wt 68.5 kg (151 lb)   SpO2 97%   BMI 24.27 kg/m     Estimated body mass index is 24.27 kg/m  as calculated from the following:    Height as of this encounter: 1.68 m (5' 6.14\").    Weight as of this " encounter: 68.5 kg (151 lb).  Physical Exam  GENERAL: alert and no distress  EYES: Eyes grossly normal to inspection, PERRL and conjunctivae and sclerae normal  HENT: ear canals and TM's normal, nose and mouth without ulcers or lesions, thyroid normal  NECK: no adenopathy, no asymmetry, masses, or scars  RESP: lungs clear to auscultation - no rales, rhonchi or wheezes  CV: regular rate and rhythm, normal S1 S2, no S3 or S4, 2/6 systolic murmur heard best at sternal borders, click or rub, no peripheral edema  ABDOMEN: soft, nontender, no hepatosplenomegaly, no masses and bowel sounds normal  MS: no gross musculoskeletal defects noted, no edema  SKIN: no suspicious lesions or rashes  NEURO: Normal strength and tone, mentation intact and speech normal  PSYCH: mentation appears normal, affect normal/bright  LYMPH: no cervical adenopathy        ASSESSMENT/PLAN:   Routine general medical examination at a health care facility  Patient reports she is doing well today. Expresses no concerns. Reviewed and updated problem list, medication list, past medical history, surgical history, family history, and social history. VSS. Exam with no concerning findings except murmur as noted below. Preventative care reviewed. Declined vaccinations today. Due for lipid panel given family history of heart disease.  - Lipid Profile; Future    Encounter for surveillance of contraceptive pills  Needing refill of birth control. Tolerating this well.   - norgestim-eth estrad triphasic (ORTHO TRI-CYCLEN LO) 0.18/0.215/0.25 MG-25 MCG tablet; Take 1 tablet by mouth daily    Screen for STD (sexually transmitted disease)  Patient desiring routine screening. No new partners. Has one male partner. No symptoms of STDs.   - HIV Antigen Antibody Combo Cascade; Future  - Hepatitis C antibody; Future  - Syphilis Screen Cascade (RPR/VDRL); Future  - Wet preparation  - Chlamydia trachomatis/Neisseria gonorrhoeae by PCR - Clinic Collect  - HIV Antigen Antibody  "Combo Cascade  - Hepatitis C antibody  - Syphilis Screen Cascade (RPR/VDRL)    Systolic murmur  2/6 systolic murmur heard during exam. It appears it was heard during pregnancy at some point. No echo on file. Patient is asymptomatic. Her father  at a young age of \"heart disease\" but she is unsure what. Murmur likely benign as she is asymptomatic but will get echo to confirm.   - Echocardiogram Complete; Future    Family history of malignant neoplasm of breast  Mother with breast cancer at age 30 y/o. Patient has seen genetics. See recommendations below. Patient is BRCA 1 negative.   Interpretation:   You did not inherit this mutation from your mother and therefore cannot pass it on to your future children.    Your screening recommendations will be the same as general population guidelines.  It is therefore recommended that you perform monthly self breast exams and continue going to your gynecologist once per year who should perform a clinical breast exam.  There is no need for you to have your first mammogram before the age of 40.  There is no need for you to have ovarian screening unless deemed necessary for another medical reason in the future.    Your risk for developing breast or ovarian cancer is the same as the general population which is 12% for breast cancer and less than 2% for ovarian cancer by the age of 70.      Counseling  Reviewed preventive health counseling, as reflected in patient instructions       Regular exercise       Healthy diet/nutrition       Vision screening       Alcohol Use       Contraception       Safe sex practices/STD prevention       Advance Care Planning        She reports that she has quit smoking. She has been exposed to tobacco smoke. She quit smokeless tobacco use about 10 years ago.          Signed Electronically by: Eleni Rodriguez MD  Answers submitted by the patient for this visit:  Annual Preventive Visit (Submitted on 2024)  Chief Complaint: Annual Exam:  Blood in " stool: No  heartburn: No  peripheral edema: No  mood changes: No  Skin sensation changes: No  tenderness: No  breast mass: No  breast discharge: No

## 2024-01-29 NOTE — PROGRESS NOTES
Preceptor Attestation:  Patient's case reviewed and discussed with the resident, Eleni Rodriguez MD, and I personally evaluated the patient. I agree with written assessment and plan of care.    Supervising Physician:  Malaika Moore MD   [facility]

## 2024-01-29 NOTE — CONFIDENTIAL NOTE
Interpersonal Safety (Abuse) Screening Follow Up    Interpersonal Safety Screen  Do you feel physically and emotionally safe where you currently live?: YES  Within the past 12 months, have you been hit, slapped, kicked or otherwise physically hurt by someone?: No  Within the past 12 months, have you been humiliated or emotionally abused in other ways by your partner or ex-partner?: No    Summary of concern: patient inaccurately filled out the first question. She says she feels safe at home and she has no concerns.     Follow Up  N/A -- inaccurately filled out

## 2024-01-30 LAB
C TRACH DNA SPEC QL PROBE+SIG AMP: NEGATIVE
CHOLEST SERPL-MCNC: 127 MG/DL
FASTING STATUS PATIENT QL REPORTED: ABNORMAL
HDLC SERPL-MCNC: 39 MG/DL
HIV 1+2 AB+HIV1 P24 AG SERPL QL IA: NONREACTIVE
LDLC SERPL CALC-MCNC: 70 MG/DL
N GONORRHOEA DNA SPEC QL NAA+PROBE: NEGATIVE
NONHDLC SERPL-MCNC: 88 MG/DL
T PALLIDUM AB SER QL: NONREACTIVE
TRIGL SERPL-MCNC: 90 MG/DL

## 2024-02-26 ENCOUNTER — HOSPITAL ENCOUNTER (OUTPATIENT)
Dept: CARDIOLOGY | Facility: HOSPITAL | Age: 34
Discharge: HOME | End: 2024-02-26
Attending: FAMILY MEDICINE | Admitting: FAMILY MEDICINE
Payer: COMMERCIAL

## 2024-02-26 DIAGNOSIS — R01.1 SYSTOLIC MURMUR: ICD-10-CM

## 2024-02-26 LAB — LVEF ECHO: NORMAL

## 2024-02-26 PROCEDURE — 93306 TTE W/DOPPLER COMPLETE: CPT

## 2025-03-02 ENCOUNTER — HEALTH MAINTENANCE LETTER (OUTPATIENT)
Age: 35
End: 2025-03-02

## 2025-06-23 ENCOUNTER — OFFICE VISIT (OUTPATIENT)
Dept: FAMILY MEDICINE | Facility: CLINIC | Age: 35
End: 2025-06-23
Payer: COMMERCIAL

## 2025-06-23 VITALS
DIASTOLIC BLOOD PRESSURE: 83 MMHG | HEIGHT: 64 IN | TEMPERATURE: 98 F | RESPIRATION RATE: 20 BRPM | HEART RATE: 119 BPM | SYSTOLIC BLOOD PRESSURE: 133 MMHG | WEIGHT: 151 LBS | OXYGEN SATURATION: 96 % | BODY MASS INDEX: 25.78 KG/M2

## 2025-06-23 DIAGNOSIS — J30.2 SEASONAL ALLERGIC RHINITIS, UNSPECIFIED TRIGGER: ICD-10-CM

## 2025-06-23 DIAGNOSIS — H69.93 DYSFUNCTION OF BOTH EUSTACHIAN TUBES: Primary | ICD-10-CM

## 2025-06-23 DIAGNOSIS — H65.93 MIDDLE EAR EFFUSION, BILATERAL: ICD-10-CM

## 2025-06-23 PROCEDURE — 3075F SYST BP GE 130 - 139MM HG: CPT

## 2025-06-23 PROCEDURE — 99213 OFFICE O/P EST LOW 20 MIN: CPT

## 2025-06-23 PROCEDURE — 3079F DIAST BP 80-89 MM HG: CPT

## 2025-06-23 RX ORDER — FLUTICASONE PROPIONATE 50 MCG
1 SPRAY, SUSPENSION (ML) NASAL DAILY
Qty: 15.8 ML | Refills: 0 | Status: SHIPPED | OUTPATIENT
Start: 2025-06-23

## 2025-06-23 RX ORDER — CETIRIZINE HYDROCHLORIDE 10 MG/1
10 TABLET ORAL DAILY
Qty: 30 TABLET | Refills: 2 | Status: SHIPPED | OUTPATIENT
Start: 2025-06-23

## 2025-06-23 ASSESSMENT — PATIENT HEALTH QUESTIONNAIRE - PHQ9
SUM OF ALL RESPONSES TO PHQ QUESTIONS 1-9: 0
SUM OF ALL RESPONSES TO PHQ QUESTIONS 1-9: 0
10. IF YOU CHECKED OFF ANY PROBLEMS, HOW DIFFICULT HAVE THESE PROBLEMS MADE IT FOR YOU TO DO YOUR WORK, TAKE CARE OF THINGS AT HOME, OR GET ALONG WITH OTHER PEOPLE: NOT DIFFICULT AT ALL

## 2025-06-23 NOTE — PROGRESS NOTES
"  Assessment & Plan     Dysfunction of both eustachian tubes  Middle ear effusion, bilateral  Seasonal allergic rhinitis, unspecified trigger  Vanna is a 34-year-old who presents for evaluation of just under 1 week bilateral ear fullness and muffled hearing.  Not associated with significant pain although does have intermittent headaches.  Headaches relieved by Tylenol, ibuprofen, or Excedrin.  Exam notable for middle ear effusion bilaterally and bilateral nasal turbinate erythema/edema. Does have a history of allergies which have been flaring recently this spring. Exam and history consistent with middle ear effusion likely in the setting of seasonal allergies. Will try oral antihistamines and intranasal corticosteroids for management.   - cetirizine (ZYRTEC) 10 MG tablet  Dispense: 30 tablet; Refill: 2  - fluticasone (FLONASE) 50 MCG/ACT nasal spray  Dispense: 15.8 mL; Refill: 0  - Follow up if symptoms worsen or fail to improve.      BMI  Estimated body mass index is 25.92 kg/m  as calculated from the following:    Height as of this encounter: 1.626 m (5' 4\").    Weight as of this encounter: 68.5 kg (151 lb).             Subjective   Vanna is a 34 year old, presenting for the following health issues:  RECHECK (Patient is coming in with both ears clogged the left is worse then the right ear.)        6/23/2025     3:40 PM   Additional Questions   Roomed by April Deluca   Accompanied by Self         6/23/2025    Information    services provided? No     HPI      Vanna is a 34 y.o. female who presents for evaluation of ear fullness and muffling. The patient reports that for the last 4-5 days she has been experiencing an intermittent sensation of muffled hearing and ear fullness L>R. This is rarely associated with mild achy pain that resolves with Tylenol and Ibuprofen. She has noted some head aches at night that improve with Excedrin. She does use q-tips intermittently and takes baths " "regularly during which time she does submerge her ears. Her ears have not been itchy or painful to touch. No recent viral URI or runny nose. Denies fever or chills. Does have a history of seasonal allergies that causes frequent sneezing and itchy/red eyes periodically. Not currently taking any medications for allergies.         Objective    /83 (BP Location: Right arm, Patient Position: Sitting, Cuff Size: Adult Regular)   Pulse 119   Temp 98  F (36.7  C) (Oral)   Resp 20   Ht 1.626 m (5' 4\")   Wt 68.5 kg (151 lb)   LMP 06/12/2025 (Approximate)   SpO2 96%   BMI 25.92 kg/m    Body mass index is 25.92 kg/m .  Physical Exam   GENERAL: alert and no distress  EYES: Eyes grossly normal to inspection, PERRL and conjunctivae and sclerae normal  HENT: Small excoriation in right ear canal. No significant erythema of the bilateral external canals. No tenderness on ear exam. Small effusion of the bilateral middle ears. Mild erythema/edema of the bilateral nasal turbinates. Mild cobblestoning of the posterior oropharynx.   NECK: no adenopathy, no asymmetry, masses, or scars  MS: no gross musculoskeletal defects noted, no edema            Signed Electronically by: Deanna Engle DO    "

## 2025-06-23 NOTE — PROGRESS NOTES
Preceptor Attestation:  Patient's case reviewed and discussed with the resident, Deanna Engle DO, and I personally evaluated the patient. I agree with written assessment and plan of care.    Supervising Physician:  Malaika Moore MD   [facility]

## 2025-07-29 ENCOUNTER — TELEPHONE (OUTPATIENT)
Dept: FAMILY MEDICINE | Facility: CLINIC | Age: 35
End: 2025-07-29
Payer: COMMERCIAL

## 2025-07-29 NOTE — TELEPHONE ENCOUNTER
Patient Quality Outreach    Patient is due for the following:   Cervical Cancer Screening - PAP Needed  Physical Preventive Adult Physical    Action(s) Taken:   Schedule a Adult Preventative    Type of outreach:    Phone, spoke to patient/parent. Appt scheduled for 8/27    Questions for provider review:    None         Florina Jarquin MA  Chart routed to None.

## 2025-08-21 ENCOUNTER — OFFICE VISIT (OUTPATIENT)
Dept: FAMILY MEDICINE | Facility: CLINIC | Age: 35
End: 2025-08-21
Payer: COMMERCIAL

## 2025-08-21 ENCOUNTER — ANCILLARY PROCEDURE (OUTPATIENT)
Dept: GENERAL RADIOLOGY | Facility: CLINIC | Age: 35
End: 2025-08-21
Attending: FAMILY MEDICINE
Payer: COMMERCIAL

## 2025-08-21 VITALS
TEMPERATURE: 98.1 F | RESPIRATION RATE: 20 BRPM | BODY MASS INDEX: 25.23 KG/M2 | HEIGHT: 66 IN | DIASTOLIC BLOOD PRESSURE: 76 MMHG | OXYGEN SATURATION: 100 % | SYSTOLIC BLOOD PRESSURE: 134 MMHG | HEART RATE: 79 BPM | WEIGHT: 157 LBS
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
## 2025-08-21 PROCEDURE — 70160 X-RAY EXAM OF NASAL BONES: CPT | Mod: TC | Performed by: RADIOLOGY

## 2025-08-26 DIAGNOSIS — Z30.41 ENCOUNTER FOR SURVEILLANCE OF CONTRACEPTIVE PILLS: ICD-10-CM

## 2025-08-26 RX ORDER — NORGESTIMATE AND ETHINYL ESTRADIOL 7DAYSX3 LO
1 KIT ORAL DAILY
Qty: 84 TABLET | Refills: 0 | Status: SHIPPED | OUTPATIENT
Start: 2025-08-26

## 2025-09-04 ENCOUNTER — PATIENT OUTREACH (OUTPATIENT)
Dept: CARE COORDINATION | Facility: CLINIC | Age: 35
End: 2025-09-04
Payer: COMMERCIAL